# Patient Record
Sex: FEMALE | Race: WHITE | NOT HISPANIC OR LATINO | Employment: FULL TIME | URBAN - METROPOLITAN AREA
[De-identification: names, ages, dates, MRNs, and addresses within clinical notes are randomized per-mention and may not be internally consistent; named-entity substitution may affect disease eponyms.]

---

## 2018-06-05 ENCOUNTER — OFFICE VISIT (OUTPATIENT)
Dept: URGENT CARE | Facility: CLINIC | Age: 23
End: 2018-06-05
Payer: COMMERCIAL

## 2018-06-05 VITALS
TEMPERATURE: 98 F | HEIGHT: 60 IN | WEIGHT: 100 LBS | HEART RATE: 84 BPM | DIASTOLIC BLOOD PRESSURE: 60 MMHG | BODY MASS INDEX: 19.63 KG/M2 | OXYGEN SATURATION: 98 % | RESPIRATION RATE: 18 BRPM | SYSTOLIC BLOOD PRESSURE: 103 MMHG

## 2018-06-05 DIAGNOSIS — S62.002A CLOSED NONDISPLACED FRACTURE OF SCAPHOID OF LEFT WRIST, UNSPECIFIED PORTION OF SCAPHOID, INITIAL ENCOUNTER: Primary | ICD-10-CM

## 2018-06-05 DIAGNOSIS — T14.90XA TRAUMA: ICD-10-CM

## 2018-06-05 PROCEDURE — 99204 OFFICE O/P NEW MOD 45 MIN: CPT | Mod: S$GLB,,, | Performed by: NURSE PRACTITIONER

## 2018-06-05 RX ORDER — HYDROCODONE BITARTRATE AND ACETAMINOPHEN 5; 325 MG/1; MG/1
1 TABLET ORAL EVERY 6 HOURS PRN
Qty: 20 TABLET | Refills: 0 | Status: SHIPPED | OUTPATIENT
Start: 2018-06-05 | End: 2019-05-02

## 2018-06-05 NOTE — PATIENT INSTRUCTIONS
Wear the thumb spica splint and sling  Rest ice elevation   Follow with ortho as soon as possible   Take tylenol/motrin as needed for pain   norco for breakthrough pain (dont take the tylenol and norco at the same time because norco has tylenol in it)  Do not take the norco if you are driving or doing anything that requires your full attention       X-ray Wrist Complete Left  Result Date: 6/5/2018  EXAMINATION: XR WRIST COMPLETE 3 VIEWS LEFT CLINICAL HISTORY: Injury, unspecified, initial encounter TECHNIQUE: PA, lateral, and oblique views of the left wrist were performed. COMPARISON: None FINDINGS: There is a fracture in the midbody of the navicular.  Bones are well mineralized.  Remainder the carpal bones appear intact.     Fracture midbody of the navicula with minimal separation.  Orthopedic consultation suggested due to the increased risk of osteonecrosis. This report was flagged in Epic as abnormal. Electronically signed by: Terry Benjamin MD Date:    06/05/2018 Time:    11:51    Navicular Wrist Fracture, Confirmed  You have a break (fracture) in one of the small bones of your wrist. This bone heals slowly. You may need to be in a cast for up to 3 months. Some navicular fractures dont heal the way they should. If so, you may need surgery at a later time.    Home care  Follow these guidelines when caring for yourself at home:  · Keep your hand elevated to reduce pain and swelling. When sitting or lying down keep your arm above the level of your heart. You can do this by placing your arm on a pillow that rests on your chest or on a pillow at your side. This is most important during the first 2 days (48 hours) after the injury.  · Put an ice pack on the injured area. Do this for 20 minutes every 1 to 2 hours the first day for pain relief. You can make an ice pack by wrapping a plastic bag of ice cubes in a thin towel. As the ice melts, be careful that the cast or splint doesnt get wet. Continue using the ice pack  3 to 4 times a day for the next 2 days. Then use the ice pack as needed to ease pain and swelling.  · Keep the cast or splint completely dry at all times. Bathe with your cast or splint out of the water. Protect it with a large plastic bag, rubber-banded at the top end. If a fiberglass cast or splint gets wet, you can dry it with a hair dryer on the cool setting.  · You may use acetaminophen or ibuprofen to control pain, unless another pain medicine was prescribed. If you have chronic liver or kidney disease, talk with your healthcare provider before using these medicines. Also talk with your provider if youve had a stomach ulcer or gastrointestinal bleeding.  · If you smoke, try to quit. Tobacco use can keep this fracture from healing the way it should. Smoking raises the risk that you will need surgery for this fracture.  Follow-up care  Follow up with your healthcare provider, or as advised. This is to make sure the bone is healing the way it should. If a splint was put on, it may be changed to a cast during your follow-up visit. When the cast is removed, you will need to do special hand and wrist exercises. These will help you get back your wrist strength and range of motion. Some people have permanent stiffness in the wrist after this type of injury.  If X-rays were taken, a radiologist may look at them. You will be told of any new findings that may affect your care.  When to seek medical advice  Call your healthcare provider right away if any of these occur:  · The cast or splint cracks  · The plaster cast or splint becomes wet or soft  · The fiberglass cast or splint stays wet for more than 24 hours  · Tightness or pain under the cast or splint gets worse  · Fingers become swollen, cold, blue, numb, or tingly  · Fingers are hard to move  · Bad odor from the cast or splint or wound fluid stains the cast or splint  · The skin around the cast or splint becomes red, swollen, or irritated  · Fever of 101ºF  (38.3ºC) or higher, or as directed by your healthcare provider  Date Last Reviewed: 5/1/2017  © 3446-5888 [employer]. [address] [place] [address], [place] [address]. All rights reserved. This information is not intended as a substitute for professional medical care. Always follow your healthcare professional's instructions.
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
## 2018-06-08 ENCOUNTER — OFFICE VISIT (OUTPATIENT)
Dept: ORTHOPEDICS | Facility: CLINIC | Age: 23
End: 2018-06-08
Payer: COMMERCIAL

## 2018-06-08 VITALS
SYSTOLIC BLOOD PRESSURE: 112 MMHG | HEART RATE: 91 BPM | BODY MASS INDEX: 21.6 KG/M2 | HEIGHT: 60 IN | WEIGHT: 110 LBS | DIASTOLIC BLOOD PRESSURE: 79 MMHG

## 2018-06-08 DIAGNOSIS — S62.022A CLOSED DISPLACED FRACTURE OF MIDDLE THIRD OF SCAPHOID BONE OF LEFT WRIST, INITIAL ENCOUNTER: Primary | ICD-10-CM

## 2018-06-08 PROCEDURE — 3008F BODY MASS INDEX DOCD: CPT | Mod: CPTII,S$GLB,, | Performed by: PHYSICIAN ASSISTANT

## 2018-06-08 PROCEDURE — 99999 PR PBB SHADOW E&M-EST. PATIENT-LVL III: CPT | Mod: PBBFAC,,, | Performed by: PHYSICIAN ASSISTANT

## 2018-06-08 PROCEDURE — 99214 OFFICE O/P EST MOD 30 MIN: CPT | Mod: S$GLB,,, | Performed by: PHYSICIAN ASSISTANT

## 2018-06-08 RX ORDER — DIAZEPAM 5 MG/1
5 TABLET ORAL
Qty: 1 TABLET | Refills: 0 | Status: SHIPPED | OUTPATIENT
Start: 2018-06-08 | End: 2018-06-14
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
## 2018-06-12 ENCOUNTER — HOSPITAL ENCOUNTER (OUTPATIENT)
Dept: RADIOLOGY | Facility: HOSPITAL | Age: 23
Discharge: HOME | End: 2018-06-12
Attending: PHYSICIAN ASSISTANT
Payer: COMMERCIAL

## 2018-06-12 DIAGNOSIS — S62.022A CLOSED DISPLACED FRACTURE OF MIDDLE THIRD OF SCAPHOID BONE OF LEFT WRIST, INITIAL ENCOUNTER: ICD-10-CM

## 2018-06-12 PROCEDURE — 73221 MRI JOINT UPR EXTREM W/O DYE: CPT | Mod: TC,LT

## 2018-06-12 PROCEDURE — 73221 MRI JOINT UPR EXTREM W/O DYE: CPT | Mod: 26,LT,, | Performed by: RADIOLOGY

## 2018-06-14 ENCOUNTER — ANESTHESIA EVENT (OUTPATIENT)
Dept: SURGERY | Facility: OTHER | Age: 23
End: 2018-06-14
Payer: COMMERCIAL

## 2018-06-14 ENCOUNTER — OFFICE VISIT (OUTPATIENT)
Dept: ORTHOPEDICS | Facility: CLINIC | Age: 23
End: 2018-06-14
Payer: COMMERCIAL

## 2018-06-14 VITALS — WEIGHT: 110 LBS | HEIGHT: 60 IN | BODY MASS INDEX: 21.6 KG/M2 | HEART RATE: 72 BPM

## 2018-06-14 DIAGNOSIS — S62.022A CLOSED DISPLACED FRACTURE OF MIDDLE THIRD OF SCAPHOID BONE OF LEFT WRIST, INITIAL ENCOUNTER: Primary | ICD-10-CM

## 2018-06-14 DIAGNOSIS — S62.022A DISPLACED FRACTURE OF MIDDLE THIRD OF NAVICULAR (SCAPHOID) BONE OF LEFT WRIST, INITIAL ENCOUNTER FOR CLOSED FRACTURE: Primary | ICD-10-CM

## 2018-06-14 PROCEDURE — 99999 PR PBB SHADOW E&M-EST. PATIENT-LVL II: CPT | Mod: PBBFAC,,, | Performed by: ORTHOPAEDIC SURGERY

## 2018-06-14 PROCEDURE — 99214 OFFICE O/P EST MOD 30 MIN: CPT | Mod: S$GLB,,, | Performed by: ORTHOPAEDIC SURGERY

## 2018-06-14 PROCEDURE — 3008F BODY MASS INDEX DOCD: CPT | Mod: CPTII,S$GLB,, | Performed by: ORTHOPAEDIC SURGERY

## 2018-06-14 RX ORDER — OXYCODONE AND ACETAMINOPHEN 5; 325 MG/1; MG/1
1 TABLET ORAL EVERY 4 HOURS PRN
Qty: 33 TABLET | Refills: 0 | Status: SHIPPED | OUTPATIENT
Start: 2018-06-14 | End: 2019-05-02

## 2018-06-14 NOTE — PROGRESS NOTES
The patient is here for MRI results. The patient does complain of continued pain. Pt thinks she may have injured it Decemeber but unsure.She had a few falls during [place]    The MRI ispositive for scaphoid fx.    Plan:  Conservative versus surgical treatment was discussed. The patient is electing for surgery. The patient is not electing for conservative treatment.   I have explained the risks, benefits, and alternatives of the procedure to the patient in great detail. The patient voices understanding and all questions have been answered. The patient agrees with to proceed as planned. Consents were performed in clinic.

## 2018-06-14 NOTE — ANESTHESIA PREPROCEDURE EVALUATION
06/14/2018  Gabriela Cooper is a 22 y.o., female.    Anesthesia Evaluation    I have reviewed the Patient Summary Reports.    I have reviewed the Nursing Notes.   I have reviewed the Medications.     Review of Systems  Anesthesia Hx:  Denies Family Hx of Anesthesia complications.   Denies Personal Hx of Anesthesia complications.   Social:  Non-Smoker    Hematology/Oncology:  Hematology Normal   Oncology Normal     EENT/Dental:EENT/Dental Normal   Cardiovascular:  Cardiovascular Normal Exercise tolerance: good     Pulmonary:  Pulmonary Normal    Renal/:  Renal/ Normal     Hepatic/GI:  Hepatic/GI Normal    Musculoskeletal:  Musculoskeletal Normal    Neurological:  Neurology Normal    Endocrine:  Endocrine Normal    Dermatological:  Skin Normal    Psych:  Psychiatric Normal           Physical Exam  General:  Well nourished      Dental:  Dental Findings: In tact        Mental Status:  Mental Status Findings:  Cooperative, Alert and Oriented         Anesthesia Plan  Type of Anesthesia, risks & benefits discussed:  Anesthesia Type:  regional  Patient's Preference:   Intra-op Monitoring Plan: standard ASA monitors  Intra-op Monitoring Plan Comments:   Post Op Pain Control Plan:   Post Op Pain Control Plan Comments:   Induction:    Beta Blocker:         Informed Consent: Patient understands risks and agrees with Anesthesia plan.  Questions answered. Anesthesia consent signed with patient.  ASA Score: 1     Day of Surgery Review of History & Physical:    H&P update referred to the surgeon.         Ready For Surgery From Anesthesia Perspective.

## 2018-06-15 ENCOUNTER — ANESTHESIA (OUTPATIENT)
Dept: SURGERY | Facility: OTHER | Age: 23
End: 2018-06-15
Payer: COMMERCIAL

## 2018-06-15 ENCOUNTER — HOSPITAL ENCOUNTER (OUTPATIENT)
Facility: OTHER | Age: 23
Discharge: HOME | End: 2018-06-15
Attending: ORTHOPAEDIC SURGERY | Admitting: ORTHOPAEDIC SURGERY
Payer: COMMERCIAL

## 2018-06-15 VITALS
DIASTOLIC BLOOD PRESSURE: 68 MMHG | RESPIRATION RATE: 16 BRPM | BODY MASS INDEX: 21.6 KG/M2 | TEMPERATURE: 98 F | HEART RATE: 68 BPM | SYSTOLIC BLOOD PRESSURE: 114 MMHG | OXYGEN SATURATION: 100 % | WEIGHT: 110 LBS | HEIGHT: 60 IN

## 2018-06-15 DIAGNOSIS — S62.009A SCAPHOID FRACTURE: ICD-10-CM

## 2018-06-15 DIAGNOSIS — S62.022A CLOSED DISPLACED FRACTURE OF MIDDLE THIRD OF SCAPHOID BONE OF LEFT WRIST, INITIAL ENCOUNTER: Primary | ICD-10-CM

## 2018-06-15 LAB
B-HCG UR QL: NEGATIVE
CTP QC/QA: YES

## 2018-06-15 PROCEDURE — 71000015 HC POSTOP RECOV 1ST HR: Performed by: ORTHOPAEDIC SURGERY

## 2018-06-15 PROCEDURE — 36000708 HC OR TIME LEV III 1ST 15 MIN: Performed by: ORTHOPAEDIC SURGERY

## 2018-06-15 PROCEDURE — 63600175 PHARM REV CODE 636 W HCPCS: Performed by: STUDENT IN AN ORGANIZED HEALTH CARE EDUCATION/TRAINING PROGRAM

## 2018-06-15 PROCEDURE — 36000709 HC OR TIME LEV III EA ADD 15 MIN: Performed by: ORTHOPAEDIC SURGERY

## 2018-06-15 PROCEDURE — 63600175 PHARM REV CODE 636 W HCPCS

## 2018-06-15 PROCEDURE — 25000003 PHARM REV CODE 250: Performed by: ANESTHESIOLOGY

## 2018-06-15 PROCEDURE — C1769 GUIDE WIRE: HCPCS | Performed by: ORTHOPAEDIC SURGERY

## 2018-06-15 PROCEDURE — 37000009 HC ANESTHESIA EA ADD 15 MINS: Performed by: ORTHOPAEDIC SURGERY

## 2018-06-15 PROCEDURE — 63600175 PHARM REV CODE 636 W HCPCS: Performed by: ANESTHESIOLOGY

## 2018-06-15 PROCEDURE — 27201423 OPTIME MED/SURG SUP & DEVICES STERILE SUPPLY: Performed by: ORTHOPAEDIC SURGERY

## 2018-06-15 PROCEDURE — C1713 ANCHOR/SCREW BN/BN,TIS/BN: HCPCS | Performed by: ORTHOPAEDIC SURGERY

## 2018-06-15 PROCEDURE — 25000003 PHARM REV CODE 250: Performed by: ORTHOPAEDIC SURGERY

## 2018-06-15 PROCEDURE — 37000008 HC ANESTHESIA 1ST 15 MINUTES: Performed by: ORTHOPAEDIC SURGERY

## 2018-06-15 PROCEDURE — 25628 OPTX CARPL SCPHD FX INT FIXJ: CPT | Mod: LT,,, | Performed by: ORTHOPAEDIC SURGERY

## 2018-06-15 PROCEDURE — 63600175 PHARM REV CODE 636 W HCPCS: Performed by: NURSE ANESTHETIST, CERTIFIED REGISTERED

## 2018-06-15 PROCEDURE — 81025 URINE PREGNANCY TEST: CPT | Performed by: ANESTHESIOLOGY

## 2018-06-15 DEVICE — SCREW BONE 18MM ACUTRAK II: Type: IMPLANTABLE DEVICE | Site: WRIST | Status: FUNCTIONAL

## 2018-06-15 RX ORDER — OXYCODONE HYDROCHLORIDE 5 MG/1
5 TABLET ORAL EVERY 4 HOURS PRN
Status: DISCONTINUED | OUTPATIENT
Start: 2018-06-15 | End: 2018-06-15 | Stop reason: HOSPADM

## 2018-06-15 RX ORDER — MUPIROCIN 20 MG/G
1 OINTMENT TOPICAL 2 TIMES DAILY
Status: DISCONTINUED | OUTPATIENT
Start: 2018-06-15 | End: 2018-06-15 | Stop reason: HOSPADM

## 2018-06-15 RX ORDER — SODIUM CHLORIDE 0.9 % (FLUSH) 0.9 %
5 SYRINGE (ML) INJECTION
Status: DISCONTINUED | OUTPATIENT
Start: 2018-06-15 | End: 2018-06-15 | Stop reason: HOSPADM

## 2018-06-15 RX ORDER — MUPIROCIN 20 MG/G
OINTMENT TOPICAL
Status: DISCONTINUED | OUTPATIENT
Start: 2018-06-15 | End: 2018-06-15 | Stop reason: HOSPADM

## 2018-06-15 RX ORDER — HYDROMORPHONE HYDROCHLORIDE 2 MG/ML
0.2 INJECTION, SOLUTION INTRAMUSCULAR; INTRAVENOUS; SUBCUTANEOUS EVERY 5 MIN PRN
Status: DISCONTINUED | OUTPATIENT
Start: 2018-06-15 | End: 2018-06-15 | Stop reason: HOSPADM

## 2018-06-15 RX ORDER — FENTANYL CITRATE 50 UG/ML
100 INJECTION, SOLUTION INTRAMUSCULAR; INTRAVENOUS EVERY 5 MIN PRN
Status: COMPLETED | OUTPATIENT
Start: 2018-06-15 | End: 2018-06-15

## 2018-06-15 RX ORDER — PROPOFOL 10 MG/ML
VIAL (ML) INTRAVENOUS CONTINUOUS PRN
Status: DISCONTINUED | OUTPATIENT
Start: 2018-06-15 | End: 2018-06-15

## 2018-06-15 RX ORDER — CEFAZOLIN SODIUM 1 G/3ML
2 INJECTION, POWDER, FOR SOLUTION INTRAMUSCULAR; INTRAVENOUS
Status: COMPLETED | OUTPATIENT
Start: 2018-06-15 | End: 2018-06-15

## 2018-06-15 RX ORDER — ONDANSETRON 8 MG/1
8 TABLET, ORALLY DISINTEGRATING ORAL EVERY 8 HOURS PRN
Status: DISCONTINUED | OUTPATIENT
Start: 2018-06-15 | End: 2018-06-15 | Stop reason: HOSPADM

## 2018-06-15 RX ORDER — SODIUM CHLORIDE, SODIUM LACTATE, POTASSIUM CHLORIDE, CALCIUM CHLORIDE 600; 310; 30; 20 MG/100ML; MG/100ML; MG/100ML; MG/100ML
INJECTION, SOLUTION INTRAVENOUS CONTINUOUS PRN
Status: DISCONTINUED | OUTPATIENT
Start: 2018-06-15 | End: 2018-06-15

## 2018-06-15 RX ORDER — ONDANSETRON 2 MG/ML
4 INJECTION INTRAMUSCULAR; INTRAVENOUS EVERY 12 HOURS PRN
Status: DISCONTINUED | OUTPATIENT
Start: 2018-06-15 | End: 2018-06-15 | Stop reason: HOSPADM

## 2018-06-15 RX ORDER — LIDOCAINE HYDROCHLORIDE 20 MG/ML
INJECTION, SOLUTION EPIDURAL; INFILTRATION; INTRACAUDAL; PERINEURAL
Status: DISCONTINUED | OUTPATIENT
Start: 2018-06-15 | End: 2018-06-15

## 2018-06-15 RX ORDER — ROPIVACAINE HYDROCHLORIDE 5 MG/ML
INJECTION, SOLUTION EPIDURAL; INFILTRATION; PERINEURAL CONTINUOUS PRN
Status: DISCONTINUED | OUTPATIENT
Start: 2018-06-15 | End: 2018-06-15

## 2018-06-15 RX ORDER — ONDANSETRON 2 MG/ML
INJECTION INTRAMUSCULAR; INTRAVENOUS
Status: DISCONTINUED | OUTPATIENT
Start: 2018-06-15 | End: 2018-06-15

## 2018-06-15 RX ORDER — SODIUM CHLORIDE 0.9 % (FLUSH) 0.9 %
3 SYRINGE (ML) INJECTION
Status: DISCONTINUED | OUTPATIENT
Start: 2018-06-15 | End: 2018-06-15 | Stop reason: HOSPADM

## 2018-06-15 RX ORDER — OXYCODONE HYDROCHLORIDE 5 MG/1
10 TABLET ORAL EVERY 4 HOURS PRN
Status: DISCONTINUED | OUTPATIENT
Start: 2018-06-15 | End: 2018-06-15 | Stop reason: HOSPADM

## 2018-06-15 RX ORDER — MIDAZOLAM HYDROCHLORIDE 1 MG/ML
2 INJECTION INTRAMUSCULAR; INTRAVENOUS
Status: COMPLETED | OUTPATIENT
Start: 2018-06-15 | End: 2018-06-15

## 2018-06-15 RX ORDER — BACITRACIN 500 [USP'U]/G
OINTMENT TOPICAL
Status: DISCONTINUED | OUTPATIENT
Start: 2018-06-15 | End: 2018-06-15 | Stop reason: HOSPADM

## 2018-06-15 RX ORDER — OXYCODONE HYDROCHLORIDE 5 MG/1
5 TABLET ORAL
Status: DISCONTINUED | OUTPATIENT
Start: 2018-06-15 | End: 2018-06-15 | Stop reason: HOSPADM

## 2018-06-15 RX ADMIN — ONDANSETRON 4 MG: 2 INJECTION INTRAMUSCULAR; INTRAVENOUS at 11:06

## 2018-06-15 RX ADMIN — PROPOFOL 75 MCG/KG/MIN: 10 INJECTION, EMULSION INTRAVENOUS at 11:06

## 2018-06-15 RX ADMIN — LIDOCAINE HYDROCHLORIDE 20 ML: 20 INJECTION, SOLUTION EPIDURAL; INFILTRATION; INTRACAUDAL; PERINEURAL at 11:06

## 2018-06-15 RX ADMIN — CEFAZOLIN 2 G: 330 INJECTION, POWDER, FOR SOLUTION INTRAMUSCULAR; INTRAVENOUS at 11:06

## 2018-06-15 RX ADMIN — FENTANYL CITRATE 100 MCG: 50 INJECTION, SOLUTION INTRAMUSCULAR; INTRAVENOUS at 10:06

## 2018-06-15 RX ADMIN — MIDAZOLAM HYDROCHLORIDE 2 MG: 1 INJECTION, SOLUTION INTRAMUSCULAR; INTRAVENOUS at 11:06

## 2018-06-15 RX ADMIN — MIDAZOLAM HYDROCHLORIDE 2 MG: 1 INJECTION, SOLUTION INTRAMUSCULAR; INTRAVENOUS at 10:06

## 2018-06-15 RX ADMIN — ROPIVACAINE HYDROCHLORIDE 15 ML/HR: 5 INJECTION, SOLUTION EPIDURAL; INFILTRATION; PERINEURAL at 11:06

## 2018-06-15 RX ADMIN — SODIUM CHLORIDE, SODIUM LACTATE, POTASSIUM CHLORIDE, AND CALCIUM CHLORIDE: 600; 310; 30; 20 INJECTION, SOLUTION INTRAVENOUS at 10:06

## 2018-06-15 NOTE — INTERVAL H&P NOTE
The patient has been examined and the H&P has been reviewed:    I concur with the findings and no changes have occurred since H&P was written.    Anesthesia/Surgery risks, benefits and alternative options discussed and understood by patient/family.          Active Hospital Problems    Diagnosis  POA    Scaphoid fracture [S62.009A]  Yes      Resolved Hospital Problems    Diagnosis Date Resolved POA   No resolved problems to display.

## 2018-06-15 NOTE — DISCHARGE SUMMARY
Discharge Note    SUMMARY     Admit Date: 6/15/2018    Discharge Date and Time:  06/15/2018 12:37 PM    Hospital Course (synopsis of major diagnoses, care, treatment, and services provided during the course of the hospital stay): Patient admitted for outpatient surgery, tolerated procedure well without complications and discharged home.         Final Diagnosis: Post-Op Diagnosis Codes:     * Displaced fracture of middle third of navicular (scaphoid) bone of left wrist, initial encounter for closed fracture [S62.022A]    Disposition: Home or Self Care    Follow Up/Patient Instructions:     Medications:  Reconciled Home Medications:      Medication List      CONTINUE taking these medications    HYDROcodone-acetaminophen 5-325 mg per tablet  Commonly known as:  NORCO  Take 1 tablet by mouth every 6 (six) hours as needed for Pain.     oxyCODONE-acetaminophen 5-325 mg per tablet  Commonly known as:  PERCOCET  Take 1 tablet by mouth every 4 (four) hours as needed.            Discharge Procedure Orders  Diet general     Call MD for:  severe uncontrolled pain     Call MD for:  difficulty breathing, headache or visual disturbances     Call MD for:  redness, tenderness, or signs of infection (pain, swelling, redness, odor or green/yellow discharge around incision site)     Other restrictions (specify):   Order Comments: NIKIA CHAVEZ     Leave dressing on - Keep it clean, dry, and intact until clinic visit       Follow-up Information     Follow up In 2 weeks.

## 2018-06-15 NOTE — INTERVAL H&P NOTE
The patient has been examined and the H&P has been reviewed:    I concur with the findings and no changes have occurred since H&P was written.  I have explained the risks, benefits, and alternatives of the procedure to the patient in great detail. The patient voices understanding and all questions have been answered. The patient agrees with to proceed as planned. Consents were performed in clinic.    Anesthesia/Surgery risks, benefits and alternative options discussed and understood by patient/family.          Active Hospital Problems    Diagnosis  POA    Scaphoid fracture [S62.009A]  Yes      Resolved Hospital Problems    Diagnosis Date Resolved POA   No resolved problems to display.

## 2018-06-15 NOTE — ANESTHESIA POSTPROCEDURE EVALUATION
Anesthesia Post Evaluation    Patient: Gabriela Cooper    Procedure(s) Performed: Procedure(s) (LRB):  ORIF, WRIST - LEFT SCAPHOID (Left)    Final Anesthesia Type: regional  Patient location during evaluation: [facility]  Patient participation: Yes- Able to Participate  Level of consciousness: awake and alert  Post-procedure vital signs: reviewed and stable  Pain management: adequate  Airway patency: patent  PONV status at discharge: No PONV  Anesthetic complications: no      Cardiovascular status: blood pressure returned to baseline  Respiratory status: unassisted and spontaneous ventilation  Hydration status: euvolemic  Follow-up not needed.        Visit Vitals  /83 (BP Location: Right arm, Patient Position: Lying)   Pulse 72   Temp 36.7 °C (98 °F) (Oral)   Resp 16   Ht 5' (1.524 m)   Wt 49.9 kg (110 lb)   LMP 06/12/2018   SpO2 100%   Breastfeeding? No   BMI 21.48 kg/m²       Pain/Campbell Score: Pain Assessment Performed: Yes (6/15/2018 12:32 PM)  Presence of Pain: denies (6/15/2018 12:32 PM)  Campbell Score: 10 (6/15/2018 12:32 PM)

## 2018-06-15 NOTE — BRIEF OP NOTE
[facility]  Brief Operative Note     SUMMARY     Surgery Date: 6/15/2018     Surgeon(s) and Role:     * Marixa Bridges MD - Primary    Assisting Surgeon: None    Pre-op Diagnosis:  Displaced fracture of middle third of navicular (scaphoid) bone of left wrist, initial encounter for closed fracture [S62.022A]    Post-op Diagnosis:  Post-Op Diagnosis Codes:     * Displaced fracture of middle third of navicular (scaphoid) bone of left wrist, initial encounter for closed fracture [S62.022A]    Procedure(s) (LRB):  ORIF, WRIST - LEFT SCAPHOID (Left)    Anesthesia: Regional    Description of the findings of the procedure:  Left scaphoid waist fx    Findings/Key Components: left scaphoid waist fx    Estimated Blood Loss: 0         Specimens:   Specimen (12h ago through future)    None

## 2018-06-15 NOTE — ANESTHESIA PROCEDURE NOTES
Left IFB    Patient location during procedure: holding area    Reason for block: primary anesthetic   Diagnosis: Left scaphoid fracture   Timeout: 6/15/2018 10:46 AM   End time: 6/15/2018 11:00 AM  Surgery related to: Or[facility]  Staffing  Anesthesiologist: JAMAL MARY  Performed: anesthesiologist   Preanesthetic Checklist  Completed: patient identified, site marked, surgical consent, pre-op evaluation, timeout performed, IV checked, risks and benefits discussed and monitors and equipment checked  Peripheral Block  Patient position: supine  Prep: ChloraPrep and site prepped and draped  Patient monitoring: heart rate, cardiac monitor, continuous pulse ox and frequent blood pressure checks  Block type: infraclavicular  Laterality: left  Injection technique: single shot  Needle  Needle type: Echogenic   Needle gauge: 21 G  Needle length: 4 in  Needle localization: anatomical landmarks and ultrasound guidance   -ultrasound image captured on disc.  Assessment  Injection assessment: negative aspiration, negative parasthesia and local visualized surrounding nerve  Paresthesia pain: none  Heart rate change: no  Slow fractionated injection: yes  Medications:  Bolus administered: 15 mL of 0.5 ropivacaine  Epinephrine added: none  Additional Notes  Plus 20 cc 2% Lidocaine

## 2018-06-15 NOTE — DISCHARGE INSTRUCTIONS
Anesthesia: Monitored Anesthesia Care (MAC)    Anesthesia Safety  · Have an adult family member or friend drive you home after the procedure.  · For the first 24 hours after your surgery:  ¨ Do not drive or use heavy equipment.  ¨ Do not make important decisions or sign documents.  ¨ Avoid alcohol.  ¨ Have someone stay with you, if possible. They can watch for problems and help keep you safe.    PLEASE FOLLOW ANY OTHER INSTRUCTIONS PROVIDED TO YOU BY DR. POOL!

## 2018-06-18 NOTE — OP NOTE
DATE OF PROCEDURE:  06/15/2018.    SERVICE:  Orthopedics.    ATTENDING SURGEON:  Marixa Bridges M.D.    RESIDENT SURGEON:  Guanako ANNE    PREOPERATIVE DIAGNOSIS:  Left scaphoid displaced wrist fracture.    POSTOPERATIVE DIAGNOSIS:  Left scaphoid displaced wrist fracture.    PROCEDURES:  1.  Open reduction and internal fixation of left scaphoid wrist fracture.  2.  Fluoro use less than 1 hour.  3.  Splint application.    ANESTHESIA:  Regional MAC.    FLUIDS:  Lactated Ringers.    BLOOD LOSS:  Minimal.  No blood was given.    TOURNIQUET TIME:  1 hour.    PACKS AND DRAINS:  None.    IMPLANTS:  An 18 mm Acutrak mini screw.    COMPLICATIONS:  None.    INDICATIONS FOR PROCEDURE:  Ms. Cooper is a 22-year-old female.  She presented   to clinic with wrist pain.  She states that she fell at some point and she is   not sure when she actually broke her wrist.  She stated that she had a bad fall   in December and she also had a few more falls.  She states she was at [facility] and she had a few missteps during that time.  After evaluating the   wrist radiographically as well as with an MRI, it showed that she had a   displaced wrist fracture.  After much discussion with the patient, we elected   for surgical intervention.  Risks and benefits were explained to the patient in   clinic.  Consents were signed in the clinic.    PROCEDURE IN DETAIL:  After correct site was marked with the patient's   participation in the holding area, the patient underwent regional anesthesia,   was brought to the Operating Room, placed in supine position, underwent MAC   anesthesia.  A well-padded nonsterile tourniquet was placed on the right upper   extremity.  The right upper extremity was prepped and draped in normal sterile   fashion.  A timeout was conducted for the correct site, procedure and the   patient to be indicated.  IV antibiotics were given to the patient   preoperatively.  After the arm was exsanguinated with Esmarch,  tourniquet was   insufflated to 250 mmHg.  An incision was marked out under C-arm fluoroscopy to   confirm placement of the proximal pole of the scaphoid.  A transverse incision   was made.  Blunt dissection was made down to the capsule.  The capsule was   elevated and incised.  The tendons were gently retracted out of the way.  The   wrist was placed into a flexed position so the proximal pole of the scaphoid was   identified.  The fracture was also identified from the same incision site and   it was displaced.  K-wire was then introduced from the proximal pole under C-arm   fluoroscopy and placed across the fracture site in the distal pole.  The K-wire   was placed in the center-center position.  After that was completed, the drill   was used over the K-wire for placement of the Acutrak screw.  Prior to that, the   K-wire was measured and showed that it was roughly 26 in measurement.  The   K-wire was then placed out of the scaphoid and into the trapezium.  Once that   was completed, the 18 mm Acutrak screw was placed over the K-wire.  Good   compression was achieved.  The base of the Acutrak screw was buried in the   cartilage and it showed that it was in good position.  Multiple x-rays were   taken under C-arm fluoroscopy to confirm position of the Acutrak screw.  The   K-wire was also removed prior to this.  The patient was placed through range of   motion, showed it did not impinge.  Again it showed good compression of the   fracture.  Area was then irrigated with copious amounts of normal saline.    Capsule was closed with Vicryl, Monocryl and Dermabond closed the skin.  Sterile   dressing was applied.  Tourniquet was deflated.  Brisk capillary refill ensued.    The patient was placed in a well-padded thumb spica splint, tolerated the   procedure well, was brought to recovery area in stable condition.    POSTOPERATIVE PLAN FOR THIS PATIENT:  She is to keep the dressing clean, dry and   intact.  We will see  her back at two weeks' time.  She will be casted, bone   stimulator port to be placed at that time.  She will be reevaluated for bone   stimulator to start roughly when she has good scar formation.      LES/IN  dd: 06/18/2018 10:38:38 (CDT)  td: 06/18/2018 15:45:31 (CDT)  Doc ID   #[id number]  Job ID #[id number]    CC:

## 2018-06-28 ENCOUNTER — DOCUMENTATION ONLY (OUTPATIENT)
Dept: ORTHOPEDICS | Facility: CLINIC | Age: 23
End: 2018-06-28

## 2018-06-28 ENCOUNTER — OFFICE VISIT (OUTPATIENT)
Dept: ORTHOPEDICS | Facility: CLINIC | Age: 23
End: 2018-06-28
Payer: COMMERCIAL

## 2018-06-28 VITALS
HEART RATE: 73 BPM | SYSTOLIC BLOOD PRESSURE: 127 MMHG | BODY MASS INDEX: 21.6 KG/M2 | HEIGHT: 60 IN | WEIGHT: 110 LBS | DIASTOLIC BLOOD PRESSURE: 85 MMHG

## 2018-06-28 DIAGNOSIS — S62.022K CLOSED DISPLACED FRACTURE OF MIDDLE THIRD OF SCAPHOID OF LEFT WRIST WITH NONUNION, SUBSEQUENT ENCOUNTER: Primary | ICD-10-CM

## 2018-06-28 PROCEDURE — 29085 APPL CAST HAND&LWR FOREARM: CPT | Mod: 58,LT,S$GLB, | Performed by: PHYSICIAN ASSISTANT

## 2018-06-28 PROCEDURE — 99024 POSTOP FOLLOW-UP VISIT: CPT | Mod: S$GLB,,, | Performed by: PHYSICIAN ASSISTANT

## 2018-06-28 PROCEDURE — 76000 FLUOROSCOPY <1 HR PHYS/QHP: CPT | Mod: 26,S$GLB,, | Performed by: PHYSICIAN ASSISTANT

## 2018-06-28 PROCEDURE — 99999 PR PBB SHADOW E&M-EST. PATIENT-LVL III: CPT | Mod: PBBFAC,,, | Performed by: PHYSICIAN ASSISTANT

## 2018-06-28 NOTE — PROGRESS NOTES
"Ms. Cooper is here today for a post-operative visit.  She is 13 days status post Open reduction and internal fixation of left scaphoid wrist fracture by Dr. Bridges on 6/15/18. She reports that she is doing well with moderate pain in the wrist.  Pain is reported as 4/10.  She is was taking pain medication initially after surgery, but has discontinued it and is now "just drinking wine, which helps some with the pain."  She denies fever, chills, and sweats since the time of the surgery. She has not started vitamin C supplementation.    Physical exam:    Vitals:    06/28/18 0915   BP: 127/85   Pulse: 73   Weight: 49.9 kg (110 lb)   Height: 5' (1.524 m)   PainSc:   4   PainLoc: Wrist     Vital signs are stable, patient is afebrile.  Patient is well dressed and well groomed, no acute distress.  Alert and oriented to person, place, and time.  Post op dressing and splint taken down.  Incision is clean, dry and intact. Very mild maceration at the incision, no wound breakdown.  There is no erythema or exudate.  There is no sign of any infection. She is NVI. Suture tails removed without difficulty.      Assessment: 13 days status post Open reduction and internal fixation of left scaphoid wrist fracture    Plan:  Gabriela was seen today for post-op evaluation.    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

## 2018-07-09 ENCOUNTER — TELEPHONE (OUTPATIENT)
Dept: ORTHOPEDICS | Facility: CLINIC | Age: 23
End: 2018-07-09

## 2018-07-09 NOTE — TELEPHONE ENCOUNTER
----- Message from Brittany Covarrubias sent at 7/9/2018  1:29 PM CDT -----  Contact: Self            Name of Who is Calling: Self      What is the request in detail: Pt states her swelling has gone down and she is concerned that her cast is too loose and wants to know if she needs to come in.      Can the clinic reply by [facility]: N      What Number to Call Back if not in [facility]: [phone]

## 2018-07-09 NOTE — TELEPHONE ENCOUNTER
Spoke with pt appt made tomorrow 7/10/18 at 8:00am for a cast check. Pt verbalized understanding.

## 2018-07-10 ENCOUNTER — TELEPHONE (OUTPATIENT)
Dept: ORTHOPEDICS | Facility: CLINIC | Age: 23
End: 2018-07-10

## 2018-07-10 ENCOUNTER — DOCUMENTATION ONLY (OUTPATIENT)
Dept: ORTHOPEDICS | Facility: CLINIC | Age: 23
End: 2018-07-10

## 2018-07-10 ENCOUNTER — OFFICE VISIT (OUTPATIENT)
Dept: ORTHOPEDICS | Facility: CLINIC | Age: 23
End: 2018-07-10
Payer: COMMERCIAL

## 2018-07-10 VITALS — WEIGHT: 110 LBS | BODY MASS INDEX: 21.6 KG/M2 | HEIGHT: 60 IN

## 2018-07-10 DIAGNOSIS — Z47.89 AFTERCARE FOR CAST OR SPLINT CHECK OR CHANGE: Primary | ICD-10-CM

## 2018-07-10 PROCEDURE — 99999 PR PBB SHADOW E&M-EST. PATIENT-LVL II: CPT | Mod: PBBFAC,,, | Performed by: PHYSICIAN ASSISTANT

## 2018-07-10 PROCEDURE — 99024 POSTOP FOLLOW-UP VISIT: CPT | Mod: S$GLB,,, | Performed by: PHYSICIAN ASSISTANT

## 2018-07-10 PROCEDURE — 29075 APPL CST ELBW FNGR SHORT ARM: CPT | Mod: 58,LT,S$GLB, | Performed by: PHYSICIAN ASSISTANT

## 2018-07-10 NOTE — PROGRESS NOTES
Pt presented for cast check. She states the cast has become loose. She feels she is able to move her thumb within the cast. A new left thumb spica cast with bone stim port was placed.     RTC regularly scheduled f/u      Regina Fox PA-C  [facility]   [facility]  [place]

## 2018-07-10 NOTE — TELEPHONE ENCOUNTER
----- Message from ABBEY Helm sent at 7/10/2018  2:21 PM CDT -----  Contact: [employer]  Not approved - please let the patient know  If poor healing at 3 months PO they will consider approving bone stimulator  Thanks!  ----- Message -----  From: Renu Alfaro LPN  Sent: 7/9/2018   3:33 PM  To: ABBEY Helm    Yes. And they won't let me do it. Sorry.   ----- Message -----  From: ABBEY Helm  Sent: 7/9/2018   3:27 PM  To: Renu Alfaro LPN    Denial for what?   We already did her surgery and MRI.  Is this for the bone stimulator?    ----- Message -----  From: Renu Alfaro LPN  Sent: 7/9/2018   2:54 PM  To: ABBEY Helm        ----- Message -----  From: Adriana Ospina  Sent: 7/9/2018   1:16 PM  To: Corine Mojica Staff              Name of Who is Calling: Tania      What is the request in detail: [employer] calling to get a Peer to Peer for a denial. Please call Tania.       Can the clinic reply by [facility]: No      What Number to [facility] Back [facility] not [facility]: [phone]

## 2018-07-10 NOTE — TELEPHONE ENCOUNTER
Spoke to Kvng,  w/Ester, patient has Stim already, but due to her high OOP/deductible, she qualified for their assistance program and will have no OOP regardless of approval/denial of [employer].   Spoke to patient and informed her of above. Pt aware.

## 2018-07-10 NOTE — TELEPHONE ENCOUNTER
Peer to peer with insurance company - bone stimulator will not be approved until 90 days post surgery if X-Ray evidence of poor healing/non-union.

## 2018-07-26 ENCOUNTER — HOSPITAL ENCOUNTER (OUTPATIENT)
Dept: RADIOLOGY | Facility: OTHER | Age: 23
Discharge: HOME | End: 2018-07-26
Attending: PHYSICIAN ASSISTANT
Payer: COMMERCIAL

## 2018-07-26 ENCOUNTER — DOCUMENTATION ONLY (OUTPATIENT)
Dept: ORTHOPEDICS | Facility: CLINIC | Age: 23
End: 2018-07-26

## 2018-07-26 ENCOUNTER — OFFICE VISIT (OUTPATIENT)
Dept: ORTHOPEDICS | Facility: CLINIC | Age: 23
End: 2018-07-26
Payer: COMMERCIAL

## 2018-07-26 VITALS
HEART RATE: 73 BPM | DIASTOLIC BLOOD PRESSURE: 71 MMHG | BODY MASS INDEX: 21.6 KG/M2 | HEIGHT: 60 IN | SYSTOLIC BLOOD PRESSURE: 102 MMHG | WEIGHT: 110 LBS

## 2018-07-26 DIAGNOSIS — S62.022A CLOSED DISPLACED FRACTURE OF MIDDLE THIRD OF SCAPHOID BONE OF LEFT WRIST, INITIAL ENCOUNTER: Primary | ICD-10-CM

## 2018-07-26 DIAGNOSIS — S62.022K CLOSED DISPLACED FRACTURE OF MIDDLE THIRD OF SCAPHOID OF LEFT WRIST WITH NONUNION, SUBSEQUENT ENCOUNTER: ICD-10-CM

## 2018-07-26 DIAGNOSIS — Z47.89 ORTHOPEDIC AFTERCARE: ICD-10-CM

## 2018-07-26 PROCEDURE — 99999 PR PBB SHADOW E&M-EST. PATIENT-LVL III: CPT | Mod: PBBFAC,,, | Performed by: PHYSICIAN ASSISTANT

## 2018-07-26 PROCEDURE — 73110 X-RAY EXAM OF WRIST: CPT | Mod: 26,LT,, | Performed by: RADIOLOGY

## 2018-07-26 PROCEDURE — 73110 X-RAY EXAM OF WRIST: CPT | Mod: TC,FY,LT

## 2018-07-26 PROCEDURE — 99024 POSTOP FOLLOW-UP VISIT: CPT | Mod: S$GLB,,, | Performed by: PHYSICIAN ASSISTANT

## 2018-07-26 NOTE — PROGRESS NOTES
Ms. Cooper is here today for a post-operative visit.  She is 41 days status post Open reduction and internal fixation of left scaphoid wrist fracture by Dr. Brigdes on 6/15/18. She reports that she is doing well with mild pain in the wrist, she reports that it is usually 2-3/10.  She has been using a bone stimulator every day for the past 2-3 weeks. She reports some tingling sensation at the incision site, no finger tingling/numbness.  She denies fever, chills, and sweats since the time of the surgery. She has started vitamin C supplementation.    Physical exam:    Vitals:    07/26/18 0835   BP: 102/71   Pulse: 73   Weight: 49.9 kg (110 lb)   Height: 5' (1.524 m)   PainSc:   3     Vital signs are stable, patient is afebrile.  Patient is well dressed and well groomed, no acute distress.  Alert and oriented to person, place, and time.  Cast taken down.  Incision is clean, dry and intact. Dry skin noted. There is no erythema or exudate.  There is no sign of any infection. She is NVI - difficult to assess motor function due to immobilization.      RADIOLOGY:  Left Wrist X-Ray, 7/26/18  FINDINGS:  Cast material obscures underlying bony detail.  Since the prior exam there has been open reduction internal fixation of a scaphoid fracture.  Metallic screw and fracture fragments appear in reasonable position and alignment.  No new fracture or dislocation identified.      Impression     As above         Assessment: 41 days status post Open reduction and internal fixation of left scaphoid wrist fracture    Plan:  Gabriela was seen today for post-op evaluation.    Diagnoses and all orders for this visit:    Closed displaced fracture of middle third of scaphoid bone of left wrist, initial encounter    Orthopedic aftercare          - continue use of bone stimulator  - New thumb spica cast with bone stimulator port placement  - Discussed cast care  - Follow up in 4 weeks with x-ray  - Start Vitamin C 500 mg daily  - Call with  questions or concerns

## 2018-08-14 ENCOUNTER — DOCUMENTATION ONLY (OUTPATIENT)
Dept: ORTHOPEDICS | Facility: CLINIC | Age: 23
End: 2018-08-14

## 2018-08-14 ENCOUNTER — OFFICE VISIT (OUTPATIENT)
Dept: ORTHOPEDICS | Facility: CLINIC | Age: 23
End: 2018-08-14
Payer: COMMERCIAL

## 2018-08-14 ENCOUNTER — TELEPHONE (OUTPATIENT)
Dept: ORTHOPEDICS | Facility: CLINIC | Age: 23
End: 2018-08-14

## 2018-08-14 DIAGNOSIS — S62.022A CLOSED DISPLACED FRACTURE OF MIDDLE THIRD OF SCAPHOID BONE OF LEFT WRIST, INITIAL ENCOUNTER: Primary | ICD-10-CM

## 2018-08-14 PROCEDURE — 29085 APPL CAST HAND&LWR FOREARM: CPT | Mod: 58,LT,S$GLB, | Performed by: PHYSICIAN ASSISTANT

## 2018-08-14 PROCEDURE — 99024 POSTOP FOLLOW-UP VISIT: CPT | Mod: S$GLB,,, | Performed by: PHYSICIAN ASSISTANT

## 2018-08-14 NOTE — TELEPHONE ENCOUNTER
----- Message from Kortney Rodriguez sent at 8/14/2018 10:05 AM CDT -----  Contact: pt            Name of Who is Calling: FLAKITA GRANDE [[id number]]      What is the request in detail: pt states she is uncomfortable because her cast is very loose.. Please advise      Can the clinic reply by [facility]: no      What [facility]: [phone]

## 2018-08-14 NOTE — PROGRESS NOTES
Patient status post Open reduction and internal fixation of left scaphoid wrist fracture by Dr. Bridges on 6/15/18.  She says that the cast has become loose.  She presents today for cast change.  New left forearm thumb spica fiberglass cast with bone stimulator port placed in clinic today.     Follow-up in 1-2 weeks as scheduled.  Call with any questions or concerns.

## 2018-08-22 ENCOUNTER — TELEPHONE (OUTPATIENT)
Dept: ORTHOPEDICS | Facility: CLINIC | Age: 23
End: 2018-08-22

## 2018-08-23 ENCOUNTER — HOSPITAL ENCOUNTER (OUTPATIENT)
Dept: RADIOLOGY | Facility: OTHER | Age: 23
Discharge: HOME | End: 2018-08-23
Attending: PHYSICIAN ASSISTANT
Payer: COMMERCIAL

## 2018-08-23 ENCOUNTER — OFFICE VISIT (OUTPATIENT)
Dept: ORTHOPEDICS | Facility: CLINIC | Age: 23
End: 2018-08-23
Payer: COMMERCIAL

## 2018-08-23 VITALS
HEART RATE: 90 BPM | BODY MASS INDEX: 21.6 KG/M2 | WEIGHT: 110 LBS | SYSTOLIC BLOOD PRESSURE: 135 MMHG | DIASTOLIC BLOOD PRESSURE: 70 MMHG | HEIGHT: 60 IN

## 2018-08-23 DIAGNOSIS — S62.022A CLOSED DISPLACED FRACTURE OF MIDDLE THIRD OF SCAPHOID BONE OF LEFT WRIST, INITIAL ENCOUNTER: Primary | ICD-10-CM

## 2018-08-23 DIAGNOSIS — S62.022A CLOSED DISPLACED FRACTURE OF MIDDLE THIRD OF SCAPHOID BONE OF LEFT WRIST, INITIAL ENCOUNTER: ICD-10-CM

## 2018-08-23 PROCEDURE — 73110 X-RAY EXAM OF WRIST: CPT | Mod: 26,LT,, | Performed by: RADIOLOGY

## 2018-08-23 PROCEDURE — 99999 PR PBB SHADOW E&M-EST. PATIENT-LVL III: CPT | Mod: PBBFAC,,, | Performed by: PHYSICIAN ASSISTANT

## 2018-08-23 PROCEDURE — 99024 POSTOP FOLLOW-UP VISIT: CPT | Mod: S$GLB,,, | Performed by: PHYSICIAN ASSISTANT

## 2018-08-23 PROCEDURE — 73110 X-RAY EXAM OF WRIST: CPT | Mod: TC,FY,LT

## 2018-08-23 NOTE — PROGRESS NOTES
Ms. Cooper is here today for a post-operative visit.  She is 69 days status post Open reduction and internal fixation of left scaphoid wrist fracture by Dr. Bridges on 6/15/18. She reports that she is doing well with mild pain in the wrist.  She has been using a bone stimulator daily. She reports some tingling sensation at the incision site, no finger tingling/numbness.  She denies fever, chills, and sweats since the time of the surgery. She has started vitamin C supplementation.    Physical exam:    Vitals:    08/23/18 0931   BP: 135/70   Pulse: 90   Weight: 49.9 kg (110 lb)   Height: 5' (1.524 m)   PainSc:   4     Vital signs are stable, patient is afebrile.  Patient is well dressed and well groomed, no acute distress.  Alert and oriented to person, place, and time.  Cast taken down.  Incision is healing well - clean, dry and intact. Dry skin noted. She reports mild tenderness with palpation incision as well as at the anatomical snuffbox.  There is no erythema or exudate.  There is no sign of any infection. She is NVI.  Decreased motion of the thumb and wrist on the left, good finger range of motion. She does report pain with thumb and wrist motion.      RADIOLOGY:  Left Wrist X-Ray, 8/23/18  FINDINGS:  Removal of previous splint.  Postop ORIF navicular fracture stable and satisfactory.  Juxta-articular disuse osteoporosis.      Impression     Healing navicular fracture status post surgical therapy.         Assessment: 69 days status post Open reduction and internal fixation of left scaphoid wrist fracture    Plan:  Gabriela was seen today for pain.    Diagnoses and all orders for this visit:    Closed displaced fracture of middle third of scaphoid bone of left wrist, initial encounter  -     X-Ray Wrist Complete Left; Future  -     Ambulatory Referral to Physical/Occupational Therapy          - continue use of bone stimulator  - Full time use of thumb spica brace, patient has brace from pre-op eval. Zaira  thumb spica splint today to get home to where her brace is.  - Start OT  - No lifting  - Follow up in 4 weeks with x-ray  - Vitamin C 500 mg daily  - Call with questions or concerns

## 2018-08-30 ENCOUNTER — CLINICAL SUPPORT (OUTPATIENT)
Dept: REHABILITATION | Facility: HOSPITAL | Age: 23
End: 2018-08-30
Payer: COMMERCIAL

## 2018-08-30 DIAGNOSIS — M25.532 WRIST PAIN, ACUTE, LEFT: ICD-10-CM

## 2018-08-30 PROCEDURE — 97110 THERAPEUTIC EXERCISES: CPT | Mod: PO

## 2018-08-30 PROCEDURE — 97165 OT EVAL LOW COMPLEX 30 MIN: CPT | Mod: PO

## 2018-08-30 NOTE — PROGRESS NOTES
[facility] Occupational Therapy  Initial Evaluation     Evaluation Date: 8/30/2018  Patient: Gabriela Cooper  YOB: 1995  Chart Number: [id number]  Referring Physician: Marcie Pool PA  Medical Diagnosis: S62.022A (ICD-10-CM) - Closed displaced fracture of middle third of scaphoid bone of left wrist, initial encounter  Therapy Diagnosis:   1. Wrist pain, acute, left          Authorization Period: 12-31-18  Date of Return to MD:  9-20-18    Visit #: 1 of 20   Time In: 10  Time Out: 11  Total Billable Time: 60    Precautions:  Standard    Subjective     Involved Side:  Left wrist / scaphoid   Dominant Side: Right  Date of Onset: Dec 2017 was original injury    Mechanism of Injury: patient fell on floor  History of Current Condition: has had surgery June 2018  Surgical Procedure: Heidi 15, 2018    PROCEDURES:  1.  Open reduction and internal fixation of left scaphoid wrist fracture.  Imaging: see image report   Previous Therapy: N/A     Patient's Goals for Therapy:  To increase motion to return to normal ADLs and IADLs , decrease pain    Pain:  Functional Pain Scale Rating 0-10:   4/10 with use  2/10 without use   4/10 with sleep     Location:  Scaphoid region   Description: Aching  Easing Factors: elevation and bracing     Functional Limitations/Social History:    Previous functional status includes: Independent with all ADLs.     Current FunctionalStatus   Home/Living environment : lives alone      Limitation of Functional Status as follows:   ADLs/IADLs:     - Feeding: I    - Bathing: I    - Dressing: I    - Grooming: I    - Driving: I     Leisure: inability to  swimming, yoga, perform in [employer]  dance team    Occupation:  Full-time student and manager at a [id number]  Working presently: employed  Duties: modified light duty at work dur reistrictions     Past Medical History/Physical Systems Review:   No past medical history on file.  Gabriela Cooper  has a past  surgical history that includes Dilation and curettage of uterus; [place] tooth extraction; and ORIF, WRIST - LEFT SCAPHOID (Left, 6/15/2018).    Gabriela has a current medication list which includes the following prescription(s): hydrocodone-acetaminophen and oxycodone-acetaminophen.    Review of patient's allergies indicates:  No Known Allergies       Objective     Mental status: alert    Observation:   Skin intact and Skin dry    Sensation: Median Nerve Distribution   8/30/2018 8/30/2018    Left Right   Monofilament Testing     Normal 1.65-2.83 Present Present   Diminished Light Touch 3.22-3.61 Present Present   Diminished Protective 3.84-4.31 Present Present   Loss of Protective 4.56-6.65 Present Present   Untestable >6.65 Present Present       Wound Assessment:   Open  Size: about an inch   Location: dorsal hand       Edema: Circumferential measurements: In centimters (minimal)       Range of Motion:   Left Active   8/30/2018   INDEX                          MP Ext/Flex wnl/60                         PIP Ext/Flex wnl/91                         DIP Ext/Flex wnl/75   THUMB                          MP Ext/Flex wnl/0                         IP Ext/Flex wnl/35                         Bertrand ABD 40                          Radial ABD 30 ( unable flatten palm)                         Opposition Thumb to DP of RF     Comments:      Wrist ROM: Left  Active  fisted 8/30/2018   Extension 30   Flexion -15   Radial Deviation 10   Ulnar Deviation 11   Supination 45   Pronation wnl       Comments: straight handed 45 with wrist extension and zero wrist flexion straight hand       No  or pinch strength at this time   Outcome Measure: FOTO    Category: Self Care      Current Score  = 54% impaired, CK = at least 40% but < 60% impaired, limited or restricted  Goal at Discharge Score =  35% impaired CJ = at least 20% but < 40% impaired, limited or restricted    Score interpretation is as follows:   TEST SCORE  Modifier   Impairment Limitation Restriction    0%  CH  0 % impaired, limited or restricted    1-19%  CI  @ least 1% but less than 20% impaired, limited or restricted    20-39%  CJ  @ least 20%<40% impaired, limited or restricted    40-59%  CK  @ least 40%<60% impaired, limited or restricted    60-79%  CL  @ least 60% <80% impaired, limited or restricted    80-99%  CM  @ least 80%<100% impaired limited or restricted    100%  CN  100% impaired, limited or restricted             Treatment     Treatment Time In/out  (separate from total time) : 10 minutes at the end of the hour       Issued HEP and educated on modality use for pain management (see patient instructions). Pt verbally understood the instructions as they were given and demonstrated proper form and technique during therapy. Pt was advise to perform these exercises free of pain, and to stop performing them if pain occurs.    See PI in note section for detail HEP    Patient/Family Education: role of OT, goals for OT, scheduling/cancellations - pt verbalized understanding. Discussed insurance limitations with patient.      Assessment       Anticipated barriers to occupational therapy:   Pt has no cultural, educational or language barriers to learning provided.    Profile and History Assessment of Occupational Performance Level of Clinical Decision Making Complexity Score   Occupational Profile:   Gabriela Cooper is a 22 y.o. female who lives alone and is currently employed as manager [employer]  MRN:[id number]  CSN:[id number]  [employer] . Gabriela Cooper has difficulty with  feeding, bathing, grooming and dressing  shopping  affecting his/her daily functional abilities. His/her main goal for therapy is increase motion .     Comorbidities:   young age    Medical and Therapy History Review:   Brief               Performance Deficits    Physical:  Joint Mobility  Joint Stability  Muscle Endurance  Fine Motor Coordination    Cognitive:  No  Deficits    Psychosocial:    No Deficits     Clinical Decision Making:  high    Assessment Process:  Comprehensive Assessments    Modification/Need for Assistance:  Significant Modifications/Assistance    Intervention Selection:  Several Treatment Options       low  Based on PMHX, co morbidities , data from assessments and functional level of assistance required with task and clinical presentation directly impacting function.     Assessment  This 22 y.o. female referred to Outpatient Occupational Therapy with diagnosis of   Encounter Diagnosis   Name Primary?    Wrist pain, acute, left     presents with limitations as described in problem list. Patient can benefit from Occupational Therapy services for Iontophoresis, Ultrasound, moist heat, PROM, AAROM, AROM, Theraputic exercises, joint mobs, home exercise program provied with written instructions and ice. The following goals were discussed with the patient and she is in agreement with them as to be addressed in the treatment plan.     Problem List:   Decreased function of Left UE, Decreased ROM, Increased pain, Decreased strength, Muscular atrophy, Inability to perform work/tasks and Inability to perform leisure activiites      Goals:     Goals to be met in 6-8  weeks:    1) Patient to be IND with HEP and modalities for pain managment.  2) Patient will  Increase AROM 15-20 degrees in hand/wrist to increase functional hand use for ADLs/work/leisure activities.  3) Patient will decrease complaints of pain to  2 out of 10 to increase functional hand use for ADL/work/leisure activities.       Plan     Plan  Gabriela to be treated by Occupational Therapy 1-2  times per week for 60 days during the certification period from   8-30-18   to 10-30-18  to achieve the established goals.         Chana Begum, LAUREN,  OTR/L, CHT  Occupational therapist, Certified Hand Therapist

## 2018-08-30 NOTE — PLAN OF CARE
[facility] Occupational Therapy  Initial Evaluation     Evaluation Date: 8/30/2018  Patient: Gabriela Cooper  YOB: 1995  Chart Number: [id number]  Referring Physician: Marcie Pool PA  Medical Diagnosis: S62.022A (ICD-10-CM) - Closed displaced fracture of middle third of scaphoid bone of left wrist, initial encounter  Therapy Diagnosis:   1. Wrist pain, acute, left          Authorization Period: 12-31-18  Date of Return to MD:  9-20-18    Visit #: 1 of 20   Time In: 10  Time Out: 11  Total Billable Time: 60    Precautions:  Standard    Subjective     Involved Side:  Left wrist / scaphoid   Dominant Side: Right  Date of Onset: Dec 2017 was original injury    Mechanism of Injury: patient fell on floor  History of Current Condition: has had surgery June 2018  Surgical Procedure: Heidi 15, 2018    PROCEDURES:  1.  Open reduction and internal fixation of left scaphoid wrist fracture.  Imaging: see image report   Previous Therapy: N/A     Patient's Goals for Therapy:  To increase motion to return to normal ADLs and IADLs , decrease pain    Pain:  Functional Pain Scale Rating 0-10:   4/10 with use  2/10 without use   4/10 with sleep     Location:  Scaphoid region   Description: Aching  Easing Factors: elevation and bracing     Functional Limitations/Social History:    Previous functional status includes: Independent with all ADLs.     Current FunctionalStatus   Home/Living environment : lives alone      Limitation of Functional Status as follows:   ADLs/IADLs:     - Feeding: I    - Bathing: I    - Dressing: I    - Grooming: I    - Driving: I     Leisure: inability to  swimming, yoga, perform in [employer]  dance team    Occupation:  Full-time student and manager at a [employer]  Working presently: employed  Duties: modified light duty at work dur reistrictions     Past Medical History/Physical Systems Review:   No past medical history on file.  Gabriela Cooper  has a past  surgical history that includes Dilation and curettage of uterus; [place] tooth extraction; and ORIF, WRIST - LEFT SCAPHOID (Left, 6/15/2018).    Gabriela has a current medication list which includes the following prescription(s): hydrocodone-acetaminophen and oxycodone-acetaminophen.    Review of patient's allergies indicates:  No Known Allergies       Objective     Mental status: alert    Observation:   Skin intact and Skin dry    Sensation: Median Nerve Distribution   8/30/2018 8/30/2018    Left Right   Monofilament Testing     Normal 1.65-2.83 Present Present   Diminished Light Touch 3.22-3.61 Present Present   Diminished Protective 3.84-4.31 Present Present   Loss of Protective 4.56-6.65 Present Present   Untestable >6.65 Present Present       Wound Assessment:   Open  Size: about an inch   Location: dorsal hand       Edema: Circumferential measurements: In centimters (minimal)       Range of Motion:   Left Active   8/30/2018   INDEX                          MP Ext/Flex wnl/60                         PIP Ext/Flex wnl/91                         DIP Ext/Flex wnl/75   THUMB                          MP Ext/Flex wnl/0                         IP Ext/Flex wnl/35                         Bertrand ABD 40                          Radial ABD 30 ( unable flatten palm)                         Opposition Thumb to DP of RF     Comments:      Wrist ROM: Left  Active  fisted 8/30/2018   Extension 30   Flexion -15   Radial Deviation 10   Ulnar Deviation 11   Supination 45   Pronation wnl       Comments: straight handed 45 with wrist extension and zero wrist flexion straight hand       No  or pinch strength at this time   Outcome Measure: FOTO    Category: Self Care      Current Score  = 54% impaired, CK = at least 40% but < 60% impaired, limited or restricted  Goal at Discharge Score =  35% impaired CJ = at least 20% but < 40% impaired, limited or restricted    Score interpretation is as follows:   TEST SCORE  Modifier   Impairment Limitation Restriction    0%  CH  0 % impaired, limited or restricted    1-19%  CI  @ least 1% but less than 20% impaired, limited or restricted    20-39%  CJ  @ least 20%<40% impaired, limited or restricted    40-59%  CK  @ least 40%<60% impaired, limited or restricted    60-79%  CL  @ least 60% <80% impaired, limited or restricted    80-99%  CM  @ least 80%<100% impaired limited or restricted    100%  CN  100% impaired, limited or restricted             Treatment     Treatment Time In/out  (separate from total time) : 10 minutes at the end of the hour       Issued HEP and educated on modality use for pain management (see patient instructions). Pt verbally understood the instructions as they were given and demonstrated proper form and technique during therapy. Pt was advise to perform these exercises free of pain, and to stop performing them if pain occurs.    See PI in note section for detail HEP    Patient/Family Education: role of OT, goals for OT, scheduling/cancellations - pt verbalized understanding. Discussed insurance limitations with patient.      Assessment       Anticipated barriers to occupational therapy:   Pt has no cultural, educational or language barriers to learning provided.    Profile and History Assessment of Occupational Performance Level of Clinical Decision Making Complexity Score   Occupational Profile:   Gabriela Cooper is a 22 y.o. female who lives alone and is currently employed as manager [employer]  MRN:[id number]  CSN:[id number]  [facility] . Gabriela Cooper has difficulty with  feeding, bathing, grooming and dressing  shopping  affecting his/her daily functional abilities. His/her main goal for therapy is increase motion .     Comorbidities:   young age    Medical and Therapy History Review:   Brief               Performance Deficits    Physical:  Joint Mobility  Joint Stability  Muscle Endurance  Fine Motor Coordination    Cognitive:  No  Deficits    Psychosocial:    No Deficits     Clinical Decision Making:  high    Assessment Process:  Comprehensive Assessments    Modification/Need for Assistance:  Significant Modifications/Assistance    Intervention Selection:  Several Treatment Options       low  Based on PMHX, co morbidities , data from assessments and functional level of assistance required with task and clinical presentation directly impacting function.     Assessment  This 22 y.o. female referred to Outpatient Occupational Therapy with diagnosis of   Encounter Diagnosis   Name Primary?    Wrist pain, acute, left     presents with limitations as described in problem list. Patient can benefit from Occupational Therapy services for Iontophoresis, Ultrasound, moist heat, PROM, AAROM, AROM, Theraputic exercises, joint mobs, home exercise program provied with written instructions and ice. The following goals were discussed with the patient and she is in agreement with them as to be addressed in the treatment plan.     Problem List:   Decreased function of Left UE, Decreased ROM, Increased pain, Decreased strength, Muscular atrophy, Inability to perform work/tasks and Inability to perform leisure activiites      Goals:     Goals to be met in 6-8  weeks:    1) Patient to be IND with HEP and modalities for pain managment.  2) Patient will  Increase AROM 15-20 degrees in hand/wrist to increase functional hand use for ADLs/work/leisure activities.  3) Patient will decrease complaints of pain to  2 out of 10 to increase functional hand use for ADL/work/leisure activities.       Plan     Plan  Gabriela to be treated by Occupational Therapy 1-2  times per week for 60 days during the certification period from   8-30-18   to 10-30-18  to achieve the established goals.         Chana Begum, LAUREN,  OTR/L, CHT  Occupational therapist, Certified Hand Therapist

## 2018-08-30 NOTE — PATIENT INSTRUCTIONS
Tendon Glides and Joint Blocking        Start at position A and move through each position slowly attempting to achieve full glide.  A-E is ONE repetition.     Complete 10 reps at 6-8 times per day.     PIP Flexion (Active Blocked)        Hold large knuckle straight using other hand. Bend middle joint of limited finger as far as possible.  Repeat 10 times. Do 6-8 sessions per day.            DIP Flexion (Active Blocked)        Hold the  finger firmly at the middle so that only the tip joint can bend.  Repeat 10 times. Do 6-8  sessions per day.  Copyright © I. All rights reserved.            AROM: Wrist Extension        With right palm down, bend wrist up.  Repeat __15__ times per set.  Do __5__ sessions per day.    Copyright © I. All rights reserved.   AROM: Wrist Flexion        With right palm up, bend wrist up.  Repeat __15__ times per set. Do _5___ sessions per day.    Copyright © [employer]. All rights reserved.   AROM: Wrist Radial / Ulnar Deviation Against Gravity        With thumb toward face, gently bend left wrist toward body, then away. Keep elbow bent and supported.  Repeat __15__ times per set.  Do __5__ sessions per day.  Copyright © [employer]. All rights reserved.     Forearm Supinators        Child sits with back straight, left elbow against side and bent at 90°, thumb up.  Resist while child attempts to turn palm up.  Do not let elbow straighten or move away from body.  Hold __3__ seconds. Repeat __15__ times. Do __5__ sessions per day.  CAUTION: Pressure should be slow and steady.    Copyright © [employer]. All rights reserved.       AROM: Thumb Abduction / Adduction        Actively pull right thumb away from palm as far as possible. Hold _3___ seconds. Then bring thumb back to touch fingers. Try not to bend fingers toward thumb.  Repeat __15__ times per set.  Do __5__ sessions per day.    Copyright © I. All rights reserved.   Opposition (Active)        Touch tip of thumb to nail tip of each finger in turn,  "making an "O" shape.  Repeat _15___ times. Do _5___ sessions per day.    Copyright © [facility]. All rights reserved.   AROM: Thumb Flexion / Extension        Actively bend right thumb across palm as far as possible. Hold _3__ seconds. Relax. Then pull thumb back into hitchhike position.  Repeat __15__ times per set.  Do __5__ sessions per day.    Copyright © [employer]. All rights reserved.   IP Flexion (Active Blocked)        Brace thumb below tip joint. Bend joint as far as possible.  Repeat __15__ times. Do __5__ sessions per day.    Copyright © [employer]. All rights reserved.   Composite Extension (Active)        Bring thumb up and out in hitchhiker position. Hold __3__ seconds.  Repeat ___15_ times. Do __5__ sessions per day.    Copyright © [employer]. All rights reserved.   MP Extension (Active)        With palm on table, lift thumb up. Hold _3___ seconds. Relax and lower thumb.  Repeat ____ times. Do _5___ sessions per day.  15           Radial Adduction/Abduction (Active)        Move thumb out to side. Move back alongside index finger.  Repeat 15 times. Do 5 sessions per day.                  "

## 2018-09-05 ENCOUNTER — CLINICAL SUPPORT (OUTPATIENT)
Dept: REHABILITATION | Facility: HOSPITAL | Age: 23
End: 2018-09-05
Payer: COMMERCIAL

## 2018-09-05 DIAGNOSIS — M25.532 WRIST PAIN, ACUTE, LEFT: Primary | ICD-10-CM

## 2018-09-05 PROCEDURE — 97110 THERAPEUTIC EXERCISES: CPT | Mod: PO

## 2018-09-05 PROCEDURE — 97022 WHIRLPOOL THERAPY: CPT | Mod: PO

## 2018-09-05 PROCEDURE — 97035 APP MDLTY 1+ULTRASOUND EA 15: CPT | Mod: PO

## 2018-09-05 NOTE — PROGRESS NOTES
Occupational Therapy Daily Treatment Note     Name: Gabriela Cooper  [facility] Number: [id number]    Therapy Diagnosis:   Encounter Diagnosis   Name Primary?    Wrist pain, acute, left Yes     Physician: Marcie Pool PA      Surgical Procedure: Heidi 15, 2018  Visit # / Visits authorized: 2/ 10  Date of Return to MD: 9-20-18      Time In:2  Time Out: 3    Total Billable Time: 60 minutes    Precautions: Standard    Subjective     Pt reports: she was compliant with home exercise program given last session.   Response to previous treatment: has increased motion with HEP      Pain: 3/10  Location: wrist     Objective     -N/A  Gabriela received the following direct contact modalities after being cleared for contraindications for 8 minutes:  -Patient receives ultrasound  for pain control and remold scar tissue to increase tissue elasticity @ 20 duty cycle, 3.3 Mhz, applied to dorsal left scar/scaphoid region, intensity = 0.6 w/cm2 for 8 minutes    Gabriela received the following manual therapy techniques for 8 minutes:     -Performed scar massage to  Dorsal scar area for 8 minutes with mini-vibrator to decrease adhesions and improve tensile glide.       Gabriela participated in dynamic functional therapeutic exercises to improve functional performance for 20  minutes, including:  In fluidotherapy machine x 2 minutes each  -all wrist motions , circumduction both directions  - thumb slides, thumb ABD ( olvera and radial)     Home Exercises and Education Provided       Written Home Exercises Provided: .  Exercises were reviewed and Gabriela was able to demonstrate them prior to the end of the session.  Gabriela demonstrated good  understanding of the education provided.     Pt received a written copy of exercises to perform at home.   See EMR under patient instructions for exercises given.     Gabriela demonstrated good  understanding of the education provided.       Assessment     Pt would  continue to benefit from skilled OT. Pt to begin scar massaging 2-3 minutes twice a day.   Gabriela is progressing well towards her goals and there are no updates to goals at this time. Pt prognosis is Good.    Pt continues to be limited in functional and leisurely pursuits. Pain limits patients participation in ADL's. Pt is not able to carryout necessary vocational tasks. Patient continues to requires cues and skilled supervision to complete HEP     Pt will continue to benefit from skilled outpatient occupational therapy to address the deficits listed in the problem list on initial evaluation provide pt/family education and to maximize pt's level of independence in the home and community environment.     Anticipated barriers to occupational therapy: n/a    Pt's spiritual, cultural and educational needs considered and pt agreeable to plan of care and goals.    Goals:  Cont goals from POC    Plan   Continue 1-2x week x 6-8  weeks during the certification period from 8-30-18  to 10-30-18  in pursuit of OT goals.    Discussed Plan of Care with patient: Yes  Updates/Grading for next session: cont       Chana Begum, OT , CHT

## 2018-09-11 ENCOUNTER — CLINICAL SUPPORT (OUTPATIENT)
Dept: REHABILITATION | Facility: HOSPITAL | Age: 23
End: 2018-09-11
Payer: COMMERCIAL

## 2018-09-11 DIAGNOSIS — M25.531 RIGHT WRIST PAIN: ICD-10-CM

## 2018-09-11 DIAGNOSIS — R29.898 WEAKNESS OF WRIST: ICD-10-CM

## 2018-09-11 PROBLEM — M25.539 WRIST PAIN: Status: RESOLVED | Noted: 2018-09-11 | Resolved: 2018-09-11

## 2018-09-11 PROBLEM — M25.539 WRIST PAIN: Status: ACTIVE | Noted: 2018-09-11

## 2018-09-11 PROBLEM — M25.532 LEFT WRIST PAIN: Status: ACTIVE | Noted: 2018-09-11

## 2018-09-11 PROCEDURE — 97035 APP MDLTY 1+ULTRASOUND EA 15: CPT | Mod: PO

## 2018-09-11 PROCEDURE — 97022 WHIRLPOOL THERAPY: CPT | Mod: PO

## 2018-09-11 PROCEDURE — 97110 THERAPEUTIC EXERCISES: CPT | Mod: PO

## 2018-09-11 NOTE — PROGRESS NOTES
Occupational Therapy Daily Treatment Note     Name: Gabriela Cooper  [facility] Number: [id number]    Therapy Diagnosis:   Encounter Diagnoses   Name Primary?    Weakness of wrist     Right wrist pain      Physician: Marcie Pool PA      Surgical Procedure: Heidi 15, 2018  Visit # / Visits authorized: 2/ 20  Date of Return to MD: 9-20-18      Time In:2  Time Out: 3    Total Billable Time: 60 minutes    Precautions: Standard    Subjective     Pt reports: she was compliant with home exercise program given last session.   Response to previous treatment: has increased motion with HEP and less pain      Pain: 2/10  Location: dorsal  wrist     Objective     -N/A  Gabriela received the following direct contact modalities after being cleared for contraindications for 8 minutes:  -Patient receives ultrasound  for pain control and remold scar tissue to increase tissue elasticity @ 20 duty cycle, 3.3 Mhz, applied to dorsal left scar/scaphoid region, intensity = 0.6 w/cm2 for 8 minutes    Gabriela received the following manual therapy techniques for 8 minutes:     -Performed scar massage to  Dorsal scar area for 8 minutes with mini-vibrator to decrease adhesions and improve tensile glide.       Gabriela participated in dynamic functional therapeutic exercises to improve functional performance for 20  minutes, including:  In fluidotherapy machine x 2 minutes each  -all wrist motions , circumduction both directions  - thumb slides, thumb ABD ( olvera and radial)     Home Exercises and Education Provided       Written Home Exercises Provided: .  Exercises were reviewed and Gabriela was able to demonstrate them prior to the end of the session.  Gabriela demonstrated good  understanding of the education provided.     Pt received a written copy of exercises to perform at home.   See EMR under patient instructions for exercises given.     Gabriela demonstrated good  understanding of the education  provided.       Assessment   Pt has arrived with about 10-15 degrees more motion with all wrist planes. Flexion is atill most limited at 20 degress while fisting, however, this is still a progression.  Pt would continue to benefit from skilled OT.   Gabriela is progressing well towards her goals and there are no updates to goals at this time. Pt prognosis is Good.    Pt continues to be limited in functional and leisurely pursuits. Pain limits patients participation in ADL's. Patient continues to requires cues and skilled supervision to complete HEP     Pt will continue to benefit from skilled outpatient occupational therapy to address the deficits listed in the problem list on initial evaluation provide pt/family education and to maximize pt's level of independence in the home and community environment.     Anticipated barriers to occupational therapy: n/a    Pt's spiritual, cultural and educational needs considered and pt agreeable to plan of care and goals.    Goals:  Cont goals from POC    Plan   Continue 1-2x week x 6-8  weeks during the certification period from 8-30-18  to 10-30-18  in pursuit of OT goals.    Discussed Plan of Care with patient: Yes  Updates/Grading for next session: cont       Chana Begum, OT , CHT

## 2018-09-13 ENCOUNTER — CLINICAL SUPPORT (OUTPATIENT)
Dept: REHABILITATION | Facility: HOSPITAL | Age: 23
End: 2018-09-13
Payer: COMMERCIAL

## 2018-09-13 DIAGNOSIS — M25.532 WRIST PAIN, ACUTE, LEFT: ICD-10-CM

## 2018-09-13 DIAGNOSIS — R29.898 WEAKNESS OF WRIST: Primary | ICD-10-CM

## 2018-09-13 PROCEDURE — 97110 THERAPEUTIC EXERCISES: CPT | Mod: PO

## 2018-09-13 PROCEDURE — 97035 APP MDLTY 1+ULTRASOUND EA 15: CPT | Mod: PO

## 2018-09-13 NOTE — PROGRESS NOTES
Occupational Therapy Daily Treatment Note     Name: Gabriela Cooper  [facility] Number: [id number]    Therapy Diagnosis:   Encounter Diagnoses   Name Primary?    Weakness of wrist Yes    Wrist pain, acute, left      Physician: Marcie Pool PA      Surgical Procedure: Heidi 15, 2018  Visit # / Visits authorized: 2/ 20  Date of Return to MD: 9-20-18      Time In:2  Time Out: 3    Total Billable Time: 60 minutes    Precautions: Standard    Subjective     Pt reports: she is performing HEP 5 times  Day. She feels soreness, but no pain.    Response to previous treatment: has increased motion with HEP and less pain      Pain: 2/10  Location: dorsal  wrist     Objective     -N/A  Gabriela received the following direct contact modalities after being cleared for contraindications for 8 minutes:  -Patient receives ultrasound  for pain control and remold scar tissue to increase tissue elasticity @ 20 duty cycle, 3.3 Mhz, applied to dorsal left scar/scaphoid region, intensity = 0.6 w/cm2 for 8 minutes    Gabriela received the following manual therapy techniques for 8 minutes:     -Performed scar massage to  Dorsal scar area for 8 minutes with mini-vibrator to decrease adhesions and improve tensile glide.       Gabriela participated in dynamic functional therapeutic exercises to improve functional performance for 20  minutes, including:  In fluidotherapy machine x 2 minutes each  -all wrist motions , circumduction both directions  - thumb slides, thumb ABD ( olvera and radial)     Home Exercises and Education Provided       Written Home Exercises Provided: .  Exercises were reviewed and Gabriela was able to demonstrate them prior to the end of the session.  Gabriela demonstrated good  understanding of the education provided.     Pt received a written copy of exercises to perform at home.   See EMR under patient instructions for exercises given.     Gabriela demonstrated good  understanding of the  education provided.       Assessment   Pt with more extension than flexion in wrist. Stiffness noted . Supplied dycem for scar remodeling and increase elasticity.  Pt would continue to benefit from skilled OT.   Gabriela is progressing well towards her goals and there are no updates to goals at this time. Pt prognosis is Good.    Pt continues to be limited in functional and leisurely pursuits. Pain limits patients participation in ADL's. Patient continues to requires cues and skilled supervision to complete HEP     Pt will continue to benefit from skilled outpatient occupational therapy to address the deficits listed in the problem list on initial evaluation provide pt/family education and to maximize pt's level of independence in the home and community environment.     Anticipated barriers to occupational therapy: n/a    Pt's spiritual, cultural and educational needs considered and pt agreeable to plan of care and goals.    Goals:  Cont goals from POC    Plan   Continue 1-2x week x 6-8  weeks during the certification period from 8-30-18  to 10-30-18  in pursuit of OT goals.    Discussed Plan of Care with patient: Yes  Updates/Grading for next session: cont       Chana Begum, OT , CHT

## 2018-09-18 ENCOUNTER — CLINICAL SUPPORT (OUTPATIENT)
Dept: REHABILITATION | Facility: HOSPITAL | Age: 23
End: 2018-09-18
Payer: COMMERCIAL

## 2018-09-18 DIAGNOSIS — M25.532 WRIST PAIN, ACUTE, LEFT: Primary | ICD-10-CM

## 2018-09-18 PROCEDURE — 97035 APP MDLTY 1+ULTRASOUND EA 15: CPT | Mod: PO

## 2018-09-18 PROCEDURE — 97110 THERAPEUTIC EXERCISES: CPT | Mod: PO

## 2018-09-18 PROCEDURE — 97022 WHIRLPOOL THERAPY: CPT | Mod: PO

## 2018-09-18 NOTE — PROGRESS NOTES
Occupational Therapy Daily Treatment Note     Name: Gabriela Cooper  [facility] Number: [id number]    Therapy Diagnosis:   Encounter Diagnosis   Name Primary?    Wrist pain, acute, left Yes     Physician: Marcie Pool PA      Surgical Procedure: Heidi 15, 2018  Visit # / Visits authorized: 2/ 20  Date of Return to MD: 9-20-18      Time In:3  Time Out: 4    Total Billable Time: 60 minutes    Precautions: Standard    Subjective     Pt reports: she is having soreness at end of the day and during her HEP. Sleeping with brace.   Response to previous treatment: has some soreness       Pain: 2/10  Location: dorsal wrist and thumb     Objective       Gabriela received the following direct contact modalities after being cleared for contraindications for 8 minutes:  -Patient receives ultrasound  for pain control and remold scar tissue to increase tissue elasticity @ 20 duty cycle, 3.3 Mhz, applied to dorsal left scar/scaphoid region, intensity = 0.6 w/cm2 for 8 minutes        Gabriela participated in dynamic functional therapeutic exercises to improve functional performance for 30  minutes, including:  In fluidotherapy machine x 2 minutes each  -all wrist motions , circumduction both directions  - thumb slides, thumb ABD ( olvera and radial)   - gentle pain-free clothespin pinch ( yellow) x 2 minutes for lateral and 3 jaw      THUMB AROM                         MP Ext/Flex wnl/0                         IP Ext/Flex Wnl/66 (+31)                         Olvera ABD 52 (+12)                         Radial ABD 50 (+20)                         Opposition Thumb to P2 of SF       Comments: wrist flexion was -15  On evaluation date, patient can now flatten hand to properly measure radial ABD.    Wrist ROM: Left  Active  fisted 8/30/2018   Extension 40(+10)   Flexion 29(+44)   Radial Deviation 13   Ulnar Deviation 17 (+6)   Supination 68(+23)   Pronation wnl       Home Exercises and Education Provided        Written Home Exercises Provided: .  Exercises were reviewed and Gabriela was able to demonstrate them prior to the end of the session.  Gabriela demonstrated good  understanding of the education provided.     Pt received a written copy of exercises to perform at home.   See EMR under patient instructions for exercises given.     Gabriela demonstrated good  understanding of the education provided.       Assessment   Pt improved with AROM , still not lifting and wearing brace.  Pt would continue to benefit from skilled OT.   Gabriela is progressing well towards her goals and there are no updates to goals at this time. Pt prognosis is Good.    Pt continues to be limited in functional and leisurely pursuits. Pain limits patients participation in ADL's. Patient continues to requires cues and skilled supervision to complete HEP     Pt will continue to benefit from skilled outpatient occupational therapy to address the deficits listed in the problem list on initial evaluation provide pt/family education and to maximize pt's level of independence in the home and community environment.     Anticipated barriers to occupational therapy: n/a    Pt's spiritual, cultural and educational needs considered and pt agreeable to plan of care and goals.    Goals:  Cont goals from POC    Plan   Continue 1-2x week x 6-8  weeks during the certification period from 8-30-18  to 10-30-18  in pursuit of OT goals.    Discussed Plan of Care with patient: Yes  Updates/Grading for next session: cont       Chana Begum, OT , CHT

## 2018-09-20 ENCOUNTER — HOSPITAL ENCOUNTER (OUTPATIENT)
Dept: RADIOLOGY | Facility: OTHER | Age: 23
Discharge: HOME | End: 2018-09-20
Attending: PHYSICIAN ASSISTANT
Payer: COMMERCIAL

## 2018-09-20 ENCOUNTER — OFFICE VISIT (OUTPATIENT)
Dept: ORTHOPEDICS | Facility: CLINIC | Age: 23
End: 2018-09-20
Payer: COMMERCIAL

## 2018-09-20 VITALS
BODY MASS INDEX: 21.6 KG/M2 | HEART RATE: 85 BPM | SYSTOLIC BLOOD PRESSURE: 120 MMHG | HEIGHT: 60 IN | DIASTOLIC BLOOD PRESSURE: 84 MMHG | WEIGHT: 110 LBS

## 2018-09-20 DIAGNOSIS — S62.022A CLOSED DISPLACED FRACTURE OF MIDDLE THIRD OF SCAPHOID BONE OF LEFT WRIST, INITIAL ENCOUNTER: Primary | ICD-10-CM

## 2018-09-20 DIAGNOSIS — Z47.89 ORTHOPEDIC AFTERCARE: ICD-10-CM

## 2018-09-20 DIAGNOSIS — S62.022A CLOSED DISPLACED FRACTURE OF MIDDLE THIRD OF SCAPHOID BONE OF LEFT WRIST, INITIAL ENCOUNTER: ICD-10-CM

## 2018-09-20 PROCEDURE — 73110 X-RAY EXAM OF WRIST: CPT | Mod: 26,LT,, | Performed by: RADIOLOGY

## 2018-09-20 PROCEDURE — 99999 PR PBB SHADOW E&M-EST. PATIENT-LVL III: CPT | Mod: PBBFAC,,, | Performed by: PHYSICIAN ASSISTANT

## 2018-09-20 PROCEDURE — 99024 POSTOP FOLLOW-UP VISIT: CPT | Mod: S$GLB,,, | Performed by: PHYSICIAN ASSISTANT

## 2018-09-20 PROCEDURE — 73110 X-RAY EXAM OF WRIST: CPT | Mod: TC,FY,LT

## 2018-09-20 NOTE — PROGRESS NOTES
Ms. Cooper is here today for a post-operative visit.  She is 3 months status post Open reduction and internal fixation of left scaphoid wrist fracture by Dr. Bridges on 6/15/18. She reports that she is doing well with mild pain in the wrist.  She has been using a bone stimulator daily. She has been attending OT and performing her home exercises, she said that it feels stiff in the mornings but more loose in the evening.  She denies fever, chills, and sweats since the time of the surgery.     Physical exam:    Vitals:    09/20/18 0948   BP: 120/84   Pulse: 85   Weight: 49.9 kg (110 lb)   Height: 5' (1.524 m)   PainSc:   2     Vital signs are stable, patient is afebrile.  Patient is well dressed and well groomed, no acute distress.  Alert and oriented to person, place, and time.  Incision is healing well - clean, dry and intact.  She reports mild tenderness with palpation of the incision. Non-tender at the anatomical snuffbox.  There is no erythema or exudate.  There is no sign of any infection. She is NVI.  Decreased motion of the thumb and wrist on the left, improving. Good finger range of motion. She does report pain with thumb and wrist motion.      RADIOLOGY:  Left Wrist X-Ray, 9/20/18  FINDINGS:  Unchanged alignment of the surgically fixed scaphoid bone.  Fracture plane still identified through the interpolar scaphoid.  No perihardware lucency to suggest loosening or infection.  Mild periarticular osteopenia of the distal radius.  No acute fractures.      Impression     Unchanged appearance of the fixed scaphoid fracture       Assessment: 3 months status post Open reduction and internal fixation of left scaphoid wrist fracture    Plan:  Gabriela was seen today for pain.    Diagnoses and all orders for this visit:    Closed displaced fracture of middle third of scaphoid bone of left wrist, initial encounter  -     X-Ray Wrist Complete Left; Future    Orthopedic aftercare          - continue use of bone  stimulator  - Wean out of thumb spica brace  - Continue OT and HEP  - No lifting  - Follow up 4 weeks with X-Ray  - Vitamin C 500 mg daily  - Call with questions or concerns

## 2018-09-21 ENCOUNTER — CLINICAL SUPPORT (OUTPATIENT)
Dept: REHABILITATION | Facility: HOSPITAL | Age: 23
End: 2018-09-21
Payer: COMMERCIAL

## 2018-09-21 DIAGNOSIS — M25.532 WRIST PAIN, ACUTE, LEFT: Primary | ICD-10-CM

## 2018-09-21 PROCEDURE — 97110 THERAPEUTIC EXERCISES: CPT | Mod: PO

## 2018-09-21 PROCEDURE — 97022 WHIRLPOOL THERAPY: CPT | Mod: PO

## 2018-09-21 PROCEDURE — 97035 APP MDLTY 1+ULTRASOUND EA 15: CPT | Mod: PO

## 2018-09-21 NOTE — PROGRESS NOTES
Occupational Therapy Daily Treatment Note     Name: Gabriela Cooper  [facility] Number: [id number]    Therapy Diagnosis:   Encounter Diagnosis   Name Primary?    Wrist pain, acute, left Yes     Physician: Marcie Pool PA      Surgical Procedure: Heidi 15, 2018  Visit # / Visits authorized: 6 / 20  Date of Return to MD: 9-20-18      Time In:9  Time Out: 10    Total Billable Time: 60 minutes    Precautions: Standard    Subjective     Pt reports: she is1/2wt#   Little pain , only soreness. Started 1/2#wt today , no pain after session, just demo tremoring  Response to previous treatment: has some soreness       Pain: 2/10  Location: dorsal wrist and thumb     Objective       Gabriela received the following direct contact modalities after being cleared for contraindications for 8 minutes:  -Patient receives ultrasound  for pain control and remold scar tissue to increase tissue elasticity @ 20 duty cycle, 3.3 Mhz, applied to dorsal left scar/scaphoid region, intensity = 0.6 w/cm2 for 8 minutes        Gabriela participated in dynamic functional therapeutic exercises to improve functional performance for 30  minutes, including:  In fluidotherapy machine x 2 minutes each  -all wrist motions , circumduction both directions  - thumb slides x 2 minutes   - gentle pain-free clothespin pinch ( yellow) x 2 minutes for lateral and 3 jaw      Out of fludiotherpay machine:  - 1/2#wt x 10 reps for each wrist motion ( and sup/pro)  - thumb ip flexion with AAROM x 15 reps  - elbow flexion with 1/2#wt x 10 reps  Triceps with 1/3#wt x 10 reps    THUMB AROM                         MP Ext/Flex wnl/51                         IP Ext/Flex Wnl/70                         Bertrand ABD 52 (+12)                         Radial ABD 50 (+20)                         Opposition Thumb to P2 of SF       Comments: wrist flexion was -15  On evaluation date, patient can now flatten hand to properly measure radial ABD.    Wrist  ROM: Left  Active  fisted 9/21/2018   Extension 40(+10)   Flexion 29(+44)   Radial Deviation 13   Ulnar Deviation 17 (+6)   Supination 68(+23)   Pronation wnl       Home Exercises and Education Provided       Written Home Exercises Provided:    Exercises were reviewed and Gabriela was able to demonstrate them prior to the end of the session.  Gabriela demonstrated good  understanding of the education provided.     Pt received a written copy of exercises to perform at home.   See EMR under patient instructions for exercises given.     Gabriela demonstrated good  understanding of the education provided.       Assessment   Pt starting supervised weight bearing with yellow 1#pressure closepin and use of 1/2#wt for wrist motions for only 10 reps each in all wrist and elbow planes of motion.  Pt would continue to benefit from skilled OT. Pt instructed to slowly wean out of brace for seated school task, watching tv, and gentle fine motor task that does not require pressure of thumb, she agreed to this.      Gabriela is progressing well towards her goals and there are no updates to goals at this time. Pt prognosis is Good.    Pt continues to be limited in functional and leisurely pursuits. Pain limits patients participation in ADL's. Patient continues to requires cues and skilled supervision to complete HEP     Pt will continue to benefit from skilled outpatient occupational therapy to address the deficits listed in the problem list on initial evaluation provide pt/family education and to maximize pt's level of independence in the home and community environment.     Anticipated barriers to occupational therapy: n/a    Pt's spiritual, cultural and educational needs considered and pt agreeable to plan of care and goals.    Goals:     Goals to be met in 6-8  weeks:    1) Patient to be IND with HEP and modalities for pain managment. (met)  2) Patient will  Increase AROM 15-20 degrees in hand/wrist to increase functional hand  use for ADLs/work/leisure activities.(progressing)  3) Patient will decrease complaints of pain to  2 out of 10 to increase functional hand use for ADL/work/leisure activities. (met)      Plan   Continue 1-2x week x 6-8  weeks during the certification period from 8-30-18  to 10-30-18  in pursuit of OT goals.    Discussed Plan of Care with patient: Yes  Updates/Grading for next session: donna Begum, OT , CHT

## 2018-09-25 ENCOUNTER — CLINICAL SUPPORT (OUTPATIENT)
Dept: REHABILITATION | Facility: HOSPITAL | Age: 23
End: 2018-09-25
Payer: COMMERCIAL

## 2018-09-25 DIAGNOSIS — M25.532 WRIST PAIN, ACUTE, LEFT: Primary | ICD-10-CM

## 2018-09-25 DIAGNOSIS — R29.898 WEAKNESS OF WRIST: ICD-10-CM

## 2018-09-25 PROCEDURE — 97110 THERAPEUTIC EXERCISES: CPT | Mod: PO

## 2018-09-25 PROCEDURE — 97022 WHIRLPOOL THERAPY: CPT | Mod: PO

## 2018-09-25 NOTE — PROGRESS NOTES
Occupational Therapy Daily Treatment Note     Name: Gabriela Cooper  Clinic Number: [id number]    Therapy Diagnosis:   Encounter Diagnosis   Name Primary?    Wrist pain, acute, left Yes     Physician: Marcie Pool PA      Surgical Procedure: Heidi 15, 2018  Visit # / Visits authorized: 7 / 20  Date of Return to MD: 9-20-18      Time In:3  Time Out: 4    Total Billable Time: 60 minutes    Precautions: Standard    Subjective     Pt reports: she is having some aches, but lifting weight is not affecting wrist today.   Response to previous treatment: has some soreness       Pain: 3/10  Location: dorsal wrist and thumb     Objective       Gabriela received the following direct contact modalities after being cleared for contraindications for 8 minutes:  -Patient receives ultrasound  for pain control and remold scar tissue to increase tissue elasticity @ 20 duty cycle, 3.3 Mhz, applied to dorsal left scar/scaphoid region, intensity = 0.6 w/cm2 for 8 minutes        Gabriela participated in dynamic functional therapeutic exercises to improve functional performance for 30  minutes, including:  In fluidotherapy machine x 1 minutes each  -all wrist motions , circumduction both directions with 1#wt   - thumb slides x 2 minutes   - gentle pain-free clothespin pinch ( yellow) x 2 minutes for lateral and 3 jaw      Out of fludiotherpay machine:  - thumb ip flexion with AAROM x 15 reps  - elbow flexion with 1#wt x 15 reps  Triceps with 1#wt x 15 reps  - prayer stretch x 10 reps     Home Exercises and Education Provided       Written Home Exercises Provided:    Exercises were reviewed using water bottle at home and Gabriela was able to demonstrate them prior to the end of the session.  Gabriela demonstrated good  understanding of the education provided.     Pt received a written copy of exercises to perform at home.   See EMR under patient instructions for exercises given.     Gabriela demonstrated  good  understanding of the education provided.       Assessment   Pt using 1#wt today for only 1 minute due to weakness; however, upgrade tolerated well. Pt used 1#wt today for bicep curls and triceps ,  felt no pain with these motions. Will supply gripping exercises next week. Pt would continue to benefit from skilled OT. Pt instructed to slowly wean out of brace for seated school task, watching tv, and gentle fine motor task that does not require pressure of thumb, she agreed to this.      Gabriela is progressing well towards her goals and there are no updates to goals at this time. Pt prognosis is Good.    Pt continues to be limited in functional and leisurely pursuits. Pain limits patients participation in ADL's. Patient continues to requires cues and skilled supervision to complete HEP     Pt will continue to benefit from skilled outpatient occupational therapy to address the deficits listed in the problem list on initial evaluation provide pt/family education and to maximize pt's level of independence in the home and community environment.     Anticipated barriers to occupational therapy: n/a    Pt's spiritual, cultural and educational needs considered and pt agreeable to plan of care and goals.    Goals:     Goals to be met in 6-8  weeks:    1) Patient to be IND with HEP and modalities for pain managment. (met)  2) Patient will  Increase AROM 15-20 degrees in hand/wrist to increase functional hand use for ADLs/work/leisure activities.(progressing)  3) Patient will decrease complaints of pain to  2 out of 10 to increase functional hand use for ADL/work/leisure activities. (met)      Plan   Continue 1-2x week x 6-8  weeks during the certification period from 8-30-18  to 10-30-18  in pursuit of OT goals.    Discussed Plan of Care with patient: Yes  Updates/Grading for next session: donna Begum, OT , CHT

## 2018-09-25 NOTE — PATIENT INSTRUCTIONS
Copyright © [facility]. All rights reserved. Flexion (Resistive)        With hand palm-up and holding __16__ ounces (bottle water), bend hand toward you at wrist. Hold __3__ seconds. Relax slowly.  Repeat __15__ times. Do __2__ sessions per day. EVERY OTHER DAY     Copyright © [facility]. All rights reserved.   Extension (Resistive)        With wrist over edge of table, lift __16__ ounces, keeping arm on table surface. Hold _3___ seconds. Lower slowly.  Repeat __15__ times. Do __2__ sessions per day. EVERY OTHER DAY .EVERY OTHER DAY   Activity: Throw a Frisbee.    Copyright © [facility]. All rights reserved.     Radial Deviation (Resistive)        Holding __16__ ounces, bend wrist upward, with thumb pointing toward you. Hold __3__ seconds.  Hold __3__ seconds. Relax slowly.  Repeat __15__ times. Do __2__ sessions per day. EVERY OTHER DAY   Activity: Use this movement to  a cup.         Copyright © [facility]. All rights reserved.     Ulnar Deviation (Strength)     This exercise is written for your right elbow. Switch sides for your left elbow.  1. Stand up straight. Hold a hand weight in your right hand. Your healthcare provider will tell you what size of hand weight to use.  2. Keep your arm straight down at your side. Bend your wrist backward to lift the weight. Dont move your arm, only your wrist.  3.  Hold __3__ seconds. Relax slowly.   Repeat __15__ times. Do __2__ sessions per day. EVERY OTHER DAY EVERY OTHER DAY         Pronation / Supination (Resistive)        Hold hammer weighing __16__ ounces and rotate palm up and down. Keep elbow flexed at side and wrist straight.  Repeat __15__ times. Do __2__ sessions per day. EVERY OTHER DAY     Copyright © I. All rights reserved.       .[email or web address]

## 2018-09-26 ENCOUNTER — CLINICAL SUPPORT (OUTPATIENT)
Dept: REHABILITATION | Facility: HOSPITAL | Age: 23
End: 2018-09-26
Payer: COMMERCIAL

## 2018-09-26 DIAGNOSIS — M25.532 LEFT WRIST PAIN: ICD-10-CM

## 2018-09-26 PROCEDURE — 97022 WHIRLPOOL THERAPY: CPT | Mod: PO

## 2018-09-26 PROCEDURE — 97110 THERAPEUTIC EXERCISES: CPT | Mod: PO

## 2018-09-26 PROCEDURE — 97035 APP MDLTY 1+ULTRASOUND EA 15: CPT | Mod: PO

## 2018-09-26 NOTE — PROGRESS NOTES
Occupational Therapy Daily Treatment Note     Name: Gabriela Cooper  Clinic Number: [id number]    Therapy Diagnosis:   Encounter Diagnosis   Name Primary?    Left wrist pain      Physician: Marcie Pool PA      Surgical Procedure: Heidi 15, 2018  Visit # / Visits authorized: 8 / 20  Date of Return to MD: 9-20-18      Time In:9  Time Out: 10    Total Billable Time: 60 minutes    Precautions: Standard    Subjective     Pt reports: she is having some aches, but lifting weight is not affecting wrist today.   Response to previous treatment: has some soreness       Pain: 3/10  Location: dorsal wrist and thumb     Objective       Gabriela received the following direct contact modalities after being cleared for contraindications for 8 minutes:  -Patient receives ultrasound  for pain control and remold scar tissue to increase tissue elasticity @ 20 duty cycle, 3.3 Mhz, applied to dorsal left scar/scaphoid region, intensity = 0.6 w/cm2 for 8 minutes        Gabriela participated in dynamic functional therapeutic exercises to improve functional performance for 30  minutes, including:  In fluidotherapy machine x 1 minutes each  -all wrist motions , circumduction both directions with 1#wt (omitted today)  - thumb slides x 2 minutes   - gentle pain-free clothespin pinch ( yellow) x 2 minutes for lateral and 3 jaw      Out of fludiotherpay machine:  - thumb ip flexion with AAROM x 15 reps  - elbow flexion with 1#wt x 15 reps  Triceps with 1#wt x 15 reps  - prayer stretch x 10 reps   -thumb extension x30 reps   - putty presses with yellow for wrist extension x 3 minutes     Home Exercises and Education Provided       Written Home Exercises Provided:    Exercises were reviewed using water bottle at home and Gabriela was able to demonstrate them prior to the end of the session.  Gabriela demonstrated good  understanding of the education provided.     Pt received a written copy of exercises to  perform at home.   See EMR under patient instructions for exercises given.     Gabriela demonstrated good  understanding of the education provided.       Assessment   Pt with better motion noted after yesterdays session. Only sore last night post weights, but no soreness or pain today.  Pt would continue to benefit from skilled OT. Pt instructed to slowly wean out of brace for seated school task, watching tv, and gentle fine motor task that does not require pressure of thumb, she agreed to this.      Gabriela is progressing well towards her goals and there are no updates to goals at this time. Pt prognosis is Good.    Pt continues to be limited in functional and leisurely pursuits. Pain limits patients participation in ADL's. Patient continues to requires cues and skilled supervision to complete HEP     Pt will continue to benefit from skilled outpatient occupational therapy to address the deficits listed in the problem list on initial evaluation provide pt/family education and to maximize pt's level of independence in the home and community environment.     Anticipated barriers to occupational therapy: n/a    Pt's spiritual, cultural and educational needs considered and pt agreeable to plan of care and goals.    Goals:     Goals to be met in 6-8  weeks:    1) Patient to be IND with HEP and modalities for pain managment. (met)  2) Patient will  Increase AROM 15-20 degrees in hand/wrist to increase functional hand use for ADLs/work/leisure activities.(progressing)  3) Patient will decrease complaints of pain to  2 out of 10 to increase functional hand use for ADL/work/leisure activities. (met)      Plan   Continue 1-2x week x 6-8  weeks during the certification period from 8-30-18  to 10-30-18  in pursuit of OT goals.    Discussed Plan of Care with patient: Yes  Updates/Grading for next session: donna Begum, OT , CHT

## 2018-10-02 ENCOUNTER — CLINICAL SUPPORT (OUTPATIENT)
Dept: REHABILITATION | Facility: HOSPITAL | Age: 23
End: 2018-10-02
Payer: COMMERCIAL

## 2018-10-02 DIAGNOSIS — M25.532 LEFT WRIST PAIN: ICD-10-CM

## 2018-10-02 PROCEDURE — 97022 WHIRLPOOL THERAPY: CPT | Mod: PO,59

## 2018-10-02 PROCEDURE — 97110 THERAPEUTIC EXERCISES: CPT | Mod: PO

## 2018-10-02 PROCEDURE — 97018 PARAFFIN BATH THERAPY: CPT | Mod: PO

## 2018-10-02 NOTE — PROGRESS NOTES
Occupational Therapy Daily Treatment Note     Name: Gabriela Cooper  Clinic Number: [id number]    Therapy Diagnosis:   Encounter Diagnosis   Name Primary?    Left wrist pain      Physician: Marcie Pool PA      Surgical Procedure: Heidi 15, 2018  Visit # / Visits authorized: 9 / 20  Date of Return to MD: 9-20-18      Time In:9  Time Out: 10    Total Billable Time: 60 minutes    Precautions: Standard    Subjective     Pt reports: she is having some aches, but lifting weight is not affecting wrist today.   Response to previous treatment: has some soreness       Pain: 3/10  Location: dorsal wrist and thumb     Objective       Gabriela received the following direct contact modalities after being cleared for contraindications for 8 minutes:  -Patient receives ultrasound  for pain control and remold scar tissue to increase tissue elasticity @ 20 duty cycle, 3.3 Mhz, applied to dorsal left scar/scaphoid region, intensity = 0.6 w/cm2 for 8 minutes        Gabriela participated in dynamic functional therapeutic exercises to improve functional performance for 30  minutes, including:  In fluidotherapy machine x 1 minutes each  -all wrist motions , circumduction both directions with 1#wt (omitted today)  - thumb slides x 2 minutes   - gentle pain-free clothespin pinch ( yellow) x 2 minutes for lateral and 3 jaw      Out of fludiotherpay machine:    - elbow flexion with 1#wt x 15 reps  Triceps with 1#wt x 15 reps  - prayer stretch x 10 reps   -thumb extension x30 reps   - putty presses with yellow for wrist extension x 3 minutes     Home Exercises and Education Provided       Written Home Exercises Provided:    Exercises were reviewed using water bottle at home and Gabriela was able to demonstrate them prior to the end of the session.  Gabriela demonstrated good  understanding of the education provided.     Pt received a written copy of exercises to perform at home.   See EMR under patient  instructions for exercises given.     Gabriela demonstrated good  understanding of the education provided.       Assessment   Pt with better motion noted after yesterdays session. Only sore last night post weights, but no soreness or pain today.  Pt would continue to benefit from skilled OT. Pt instructed to slowly wean out of brace for seated school task, watching tv, and gentle fine motor task that does not require pressure of thumb, she agreed to this.      Gabriela is progressing well towards her goals and there are no updates to goals at this time. Pt prognosis is Good.    Pt continues to be limited in functional and leisurely pursuits. Pain limits patients participation in ADL's. Patient continues to requires cues and skilled supervision to complete HEP     Pt will continue to benefit from skilled outpatient occupational therapy to address the deficits listed in the problem list on initial evaluation provide pt/family education and to maximize pt's level of independence in the home and community environment.     Anticipated barriers to occupational therapy: n/a    Pt's spiritual, cultural and educational needs considered and pt agreeable to plan of care and goals.    Goals:     Goals to be met in 6-8  weeks:    1) Patient to be IND with HEP and modalities for pain managment. (met)  2) Patient will  Increase AROM 15-20 degrees in hand/wrist to increase functional hand use for ADLs/work/leisure activities.(progressing)  3) Patient will decrease complaints of pain to  2 out of 10 to increase functional hand use for ADL/work/leisure activities. (met)      Plan   Continue 1-2x week x 6-8  weeks during the certification period from 8-30-18  to 10-30-18  in pursuit of OT goals.    Discussed Plan of Care with patient: Yes  Updates/Grading for next session: donna Begum, OT , CHT

## 2018-10-05 ENCOUNTER — CLINICAL SUPPORT (OUTPATIENT)
Dept: REHABILITATION | Facility: HOSPITAL | Age: 23
End: 2018-10-05
Payer: COMMERCIAL

## 2018-10-05 DIAGNOSIS — M25.532 LEFT WRIST PAIN: ICD-10-CM

## 2018-10-05 PROCEDURE — 97035 APP MDLTY 1+ULTRASOUND EA 15: CPT | Mod: PO

## 2018-10-05 PROCEDURE — 97022 WHIRLPOOL THERAPY: CPT | Mod: PO

## 2018-10-05 PROCEDURE — 97018 PARAFFIN BATH THERAPY: CPT | Mod: PO

## 2018-10-05 PROCEDURE — 97110 THERAPEUTIC EXERCISES: CPT | Mod: PO

## 2018-10-05 NOTE — PROGRESS NOTES
Occupational Therapy Daily Treatment Note     Name: Gabriela Alicea [facility] Number: [id number]    Therapy Diagnosis:   Encounter Diagnosis   Name Primary?    Left wrist pain      Physician: Marcie Pool PA      Surgical Procedure: Heidi 15, 2018  Visit # / Visits authorized: 10 / 20  Date of Return to MD: 9-20-18      Time In:9  Time Out: 10    Total Billable Time: 60 minutes    Precautions: Standard    Subjective     Pt reports: she is having some aches, but lifting weight is not affecting wrist today.   Response to previous treatment: has some soreness       Pain: 3/10  Location: dorsal wrist and thumb     Objective       Gabriela received the following direct contact modalities after being cleared for contraindications for 8 minutes:  -Patient receives ultrasound  for pain control and remold scar tissue to increase tissue elasticity @ 20 duty cycle, 3.3 Mhz, applied to dorsal left scar/scaphoid region, intensity = 0.6 w/cm2 for 8 minutes        Gabriela participated in dynamic functional therapeutic exercises to improve functional performance for 30  minutes, including:  In fluidotherapy machine x 1 minutes each  -all wrist motions , circumduction both directions with 1#wt (omitted today)  - thumb slides x 2 minutes   - gentle pain-free clothespin pinch ( yellow) x 2 minutes for lateral and 3 jaw                              Occupational Therapy Daily Treatment Note     Name: Gabriela Alicea [facility] Number: [id number]    Therapy Diagnosis:   Encounter Diagnosis   Name Primary?    Left wrist pain      Physician: Marcie Pool PA      Surgical Procedure: Heidi 15, 2018  Visit # / Visits authorized: 10 / 20  Date of Return to MD: 9-20-18      Time In:9  Time Out: 10    Total Billable Time: 60 minutes    Precautions: Standard    Subjective     Pt reports: she is1/2wt#   Little pain , only soreness. Started 1/2#wt today , no pain after session, just demo tremoring  Response  to previous treatment: has some soreness       Pain: 2/10  Location: dorsal wrist and thumb     Objective       Gabriela received the following direct contact modalities after being cleared for contraindications for 8 minutes:  -Patient receives ultrasound  for pain control and remold scar tissue to increase tissue elasticity @ 20 duty cycle, 3.3 Mhz, applied to dorsal left scar/scaphoid region, intensity = 0.6 w/cm2 for 8 minutes      - scar extractor used and then Performed scar massage to left dorsal scar  area for 6 minutes with mini vibrator to decrease adhesions and improve tensile glide.     Gabriela participated in dynamic functional therapeutic exercises to improve functional performance for 30  minutes, including:  In fluidotherapy machine x 2 minutes each  -all wrist motions , circumduction both directions (omitted)  - thumb slides x 2 minutes   - gentle pain-free clothespin pinch ( yellow) x 2 minutes for lateral and 3 jaw  - 3 jaw and lateral pinch with 2#(red) clothespin x 2 minutes each   - finger extension with yellow power web x 2 minutes     Out of fludiotherpay machine:  - 1/2#wt x 10 reps for each wrist motion ( and sup/pro)- omitted  - thumb ip flexion with AAROM x 15 reps  - elbow flexion with 1/2#wt x 10 reps  Triceps with 1/3#wt x 10 reps    Applied kineso tape to scar to reduce adhesion on dorsal scar, supplied patient with small strip to apply all weekend     Home Exercises and Education Provided       Written Home Exercises Provided:    Exercises were reviewed and Gabriela was able to demonstrate them prior to the end of the session.  Gabriela demonstrated good  understanding of the education provided.     Pt received a written copy of exercises to perform at home.   See EMR under patient instructions for exercises given.     Gabriela demonstrated good  understanding of the education provided.       Assessment   Pt would continue to benefit from skilled OT. Pt with adhered scar noted with  wrist extension , performed scar massaging for scar tissue mobilization.     Gabriela is progressing well towards her goals and there are no updates to goals at this time. Pt prognosis is Good.    Pt continues to be limited in functional and leisurely pursuits. Pain limits patients participation in ADL's. Patient continues to requires cues and skilled supervision to complete HEP     Pt will continue to benefit from skilled outpatient occupational therapy to address the deficits listed in the problem list on initial evaluation provide pt/family education and to maximize pt's level of independence in the home and community environment.     Anticipated barriers to occupational therapy: n/a    Pt's spiritual, cultural and educational needs considered and pt agreeable to plan of care and goals.    Goals:     Goals to be met in 6-8  weeks:    1) Patient to be IND with HEP and modalities for pain managment. (met)  2) Patient will  Increase AROM 15-20 degrees in hand/wrist to increase functional hand use for ADLs/work/leisure activities.(progressing)  3) Patient will decrease complaints of pain to  2 out of 10 to increase functional hand use for ADL/work/leisure activities. (met)      Plan   Continue 1-2x week x 6-8  weeks during the certification period from 8-30-18  to 10-30-18  in pursuit of OT goals.    Discussed Plan of Care with patient: Yes  Updates/Grading for next session: donna Begum, OT , CHT   Out of fludiotherpay machine:    - elbow flexion with 1#wt x 15 reps  Triceps with 1#wt x 15 reps  - prayer stretch x 10 reps   -thumb extension x30 reps   - putty presses with yellow for wrist extension x 3 minutes     Home Exercises and Education Provided       Written Home Exercises Provided:    Exercises were reviewed using water bottle at home and Gabriela was able to demonstrate them prior to the end of the session.  Gabriela demonstrated good  understanding of the education provided.     Pt received a  written copy of exercises to perform at home.   See EMR under patient instructions for exercises given.     Gabriela demonstrated good  understanding of the education provided.       Assessment   Pt with better motion noted after yesterdays session. Only sore last night post weights, but no soreness or pain today.  Pt would continue to benefit from skilled OT. Pt instructed to slowly wean out of brace for seated school task, watching tv, and gentle fine motor task that does not require pressure of thumb, she agreed to this.      Gabriela is progressing well towards her goals and there are no updates to goals at this time. Pt prognosis is Good.    Pt continues to be limited in functional and leisurely pursuits. Pain limits patients participation in ADL's. Patient continues to requires cues and skilled supervision to complete HEP     Pt will continue to benefit from skilled outpatient occupational therapy to address the deficits listed in the problem list on initial evaluation provide pt/family education and to maximize pt's level of independence in the home and community environment.     Anticipated barriers to occupational therapy: n/a    Pt's spiritual, cultural and educational needs considered and pt agreeable to plan of care and goals.    Goals:     Goals to be met in 6-8  weeks:    1) Patient to be IND with HEP and modalities for pain managment. (met)  2) Patient will  Increase AROM 15-20 degrees in hand/wrist to increase functional hand use for ADLs/work/leisure activities.(progressing)  3) Patient will decrease complaints of pain to  2 out of 10 to increase functional hand use for ADL/work/leisure activities. (met)      Plan   Continue 1-2x week x 6-8  weeks during the certification period from 8-30-18  to 10-30-18  in pursuit of OT goals.    Discussed Plan of Care with patient: Yes  Updates/Grading for next session: donna Begum, OT , CHT

## 2018-10-08 ENCOUNTER — CLINICAL SUPPORT (OUTPATIENT)
Dept: REHABILITATION | Facility: HOSPITAL | Age: 23
End: 2018-10-08
Payer: COMMERCIAL

## 2018-10-08 DIAGNOSIS — M25.532 LEFT WRIST PAIN: ICD-10-CM

## 2018-10-08 PROCEDURE — 97110 THERAPEUTIC EXERCISES: CPT | Mod: PO

## 2018-10-08 PROCEDURE — 97022 WHIRLPOOL THERAPY: CPT | Mod: PO,59

## 2018-10-08 PROCEDURE — 97035 APP MDLTY 1+ULTRASOUND EA 15: CPT | Mod: PO

## 2018-10-08 NOTE — PROGRESS NOTES
Occupational Therapy Daily Treatment Note     Name: Gabriela Cooper  [facility] Number: [id number]    Therapy Diagnosis:   Encounter Diagnosis   Name Primary?    Left wrist pain      Physician: Marcie Pool PA      Surgical Procedure: Heidi 15, 2018  Visit # / Visits authorized: 11 / 20  Date of Return to MD: 9-20-18      Time In:1  Time Out: 2    Total Billable Time: 60 minutes    Precautions: Standard    Subjective     Pt reports: she is1/2wt#   Little pain , only soreness. Started 1/2#wt today , no pain after session, just demo tremoring  Response to previous treatment: has some soreness       Pain: 2/10  Location: dorsal wrist and thumb     Objective       Gabriela received the following direct contact modalities after being cleared for contraindications for 8 minutes:  -Patient receives ultrasound  for pain control and remold scar tissue to increase tissue elasticity @ 20 duty cycle, 3.3 Mhz, applied to dorsal left scar/scaphoid region, intensity = 0.6 w/cm2 for 8 minutes      - scar extractor used and then Performed scar massage to left dorsal scar  area for 6 minutes with mini vibrator to decrease adhesions and improve tensile glide.     Gabriela participated in dynamic functional therapeutic exercises to improve functional performance for 30  minutes, including:  In fluidotherapy machine x 2 minutes each  -all wrist motions , circumduction both directions with 1#wt   - thumb slides  - 3 jaw and lateral pinch with 2#(red) clothespin  - finger extension with yellow power web   - thumb olvera and radial ABD with yellow band #wt x 2 minutes  -Triceps with 2#wt x 10 reps  - elbow flexion with 1#wt x 15 reps  - prayer stretch x 10 reps   -thumb extension x30 reps   - putty presses with yellow for wrist extension x 3 minutes     Home Exercises and Education Provided       Written Home Exercises Provided:    Exercises were reviewed using water bottle at home and Gabriela was  able to demonstrate them prior to the end of the session.  Gabriela demonstrated good  understanding of the education provided.     Pt received a written copy of exercises to perform at home.   See EMR under patient instructions for exercises given.     Gabriela demonstrated good  understanding of the education provided.       Assessment   Pt with better motion noted after yesterdays session. Only sore last night post weights, but no soreness or pain today.  Pt would continue to benefit from skilled OT. Pt instructed to slowly wean out of brace for seated school task, watching tv, and gentle fine motor task that does not require pressure of thumb, she agreed to this.      Gabriela is progressing well towards her goals and there are no updates to goals at this time. Pt prognosis is Good.    Pt continues to be limited in functional and leisurely pursuits. Pain limits patients participation in ADL's. Patient continues to requires cues and skilled supervision to complete HEP     Pt will continue to benefit from skilled outpatient occupational therapy to address the deficits listed in the problem list on initial evaluation provide pt/family education and to maximize pt's level of independence in the home and community environment.     Anticipated barriers to occupational therapy: n/a    Pt's spiritual, cultural and educational needs considered and pt agreeable to plan of care and goals.    Goals:     Goals to be met in 6-8  weeks:    1) Patient to be IND with HEP and modalities for pain managment. (met)  2) Patient will  Increase AROM 15-20 degrees in hand/wrist to increase functional hand use for ADLs/work/leisure activities.(progressing)  3) Patient will decrease complaints of pain to  2 out of 10 to increase functional hand use for ADL/work/leisure activities. (met)      Plan   Continue 1-2x week x 6-8  weeks during the certification period from 8-30-18  to 10-30-18  in pursuit of OT goals.    Discussed Plan of Care  with patient: Yes  Updates/Grading for next session: donna Begum, OT , CHT

## 2018-10-11 ENCOUNTER — CLINICAL SUPPORT (OUTPATIENT)
Dept: REHABILITATION | Facility: HOSPITAL | Age: 23
End: 2018-10-11
Payer: COMMERCIAL

## 2018-10-11 DIAGNOSIS — M25.532 LEFT WRIST PAIN: ICD-10-CM

## 2018-10-11 PROCEDURE — 97022 WHIRLPOOL THERAPY: CPT | Mod: PO

## 2018-10-11 PROCEDURE — 97018 PARAFFIN BATH THERAPY: CPT | Mod: KX,PO

## 2018-10-11 PROCEDURE — 97110 THERAPEUTIC EXERCISES: CPT | Mod: PO

## 2018-10-11 NOTE — PROGRESS NOTES
Occupational Therapy Daily Treatment Note     Name: Gabriela Cooper  Clinic Number: [id number]    Therapy Diagnosis:   Encounter Diagnosis   Name Primary?    Left wrist pain      Physician: Marcie Pool PA      Surgical Procedure: Heidi 15, 2018  Visit # / Visits authorized: 12 / 20  Date of Return to MD: 9-20-18      Time In:2   Time Out: 23  Total Billable Time: 60 minutes    Precautions: Standard    Subjective     Pt reports: she is1/2wt#   Little pain , only soreness. Started 1/2#wt today , no pain after session, just demo tremoring  Response to previous treatment: has some soreness       Pain: 2/10  Location: dorsal wrist and thumb     Objective       Gabriela received the following direct contact modalities after being cleared for contraindications for 8 minutes:  -Patient receives ultrasound  for pain control and remold scar tissue to increase tissue elasticity @ 20 duty cycle, 3.3 Mhz, applied to dorsal left scar/scaphoid region, intensity = 0.6 w/cm2 for 8 minutes      - scar extractor used and then Performed scar massage to left dorsal scar  area for 6 minutes with mini vibrator to decrease adhesions and improve tensile glide.     Gabriela participated in dynamic functional therapeutic exercises to improve functional performance for 30  minutes, including:  In fluidotherapy machine x 2 minutes each  -all wrist motions , circumduction both directions with 1#wt   - thumb slides  - 3 jaw and lateral pinch with 2#(red) clothespin  - finger extension with yellow power web   - thumb olvera and radial ABD with yellow band #wt x 2 minutes  -Triceps with 2#wt x 10 reps  - elbow flexion with 1#wt x 15 reps  - prayer stretch x 10 reps   -thumb extension x30 reps   - putty presses with yellow for wrist extension x 3 minutes     Home Exercises and Education Provided       Written Home Exercises Provided:  Supplied putty for ONLY gripping x 3 minutes every other day. No thumb  use at this time. Exercises were reviewed using water bottle at home and Gabriela was able to demonstrate them prior to the end of the session.  Gabriela demonstrated good  understanding of the education provided.     Pt received a written copy of exercises to perform at home.   See EMR under patient instructions for exercises given.     Gabriela demonstrated good  understanding of the education provided.       Assessment   Pt with better motion noted after yesterdays session. Only sore last night post weights, but no soreness or pain today.  Pt would continue to benefit from skilled OT. Pt instructed to slowly wean out of brace for seated school task, watching tv, and gentle fine motor task that does not require pressure of thumb, she agreed to this.      Gabriela is progressing well towards her goals and there are no updates to goals at this time. Pt prognosis is Good.    Pt continues to be limited in functional and leisurely pursuits. Pain limits patients participation in ADL's. Patient continues to requires cues and skilled supervision to complete HEP     Pt will continue to benefit from skilled outpatient occupational therapy to address the deficits listed in the problem list on initial evaluation provide pt/family education and to maximize pt's level of independence in the home and community environment.     Anticipated barriers to occupational therapy: n/a    Pt's spiritual, cultural and educational needs considered and pt agreeable to plan of care and goals.    Goals:     Goals to be met in 6-8  weeks:    1) Patient to be IND with HEP and modalities for pain managment. (met)  2) Patient will  Increase AROM 15-20 degrees in hand/wrist to increase functional hand use for ADLs/work/leisure activities.(progressing)  3) Patient will decrease complaints of pain to  2 out of 10 to increase functional hand use for ADL/work/leisure activities. (met)      Plan   Continue 1-2x week x 6-8  weeks during the  certification period from 8-30-18  to 10-30-18  in pursuit of OT goals.    Discussed Plan of Care with patient: Yes  Updates/Grading for next session: donna Begum, OT , CHT

## 2018-10-16 ENCOUNTER — CLINICAL SUPPORT (OUTPATIENT)
Dept: REHABILITATION | Facility: HOSPITAL | Age: 23
End: 2018-10-16
Payer: COMMERCIAL

## 2018-10-16 DIAGNOSIS — M25.532 LEFT WRIST PAIN: ICD-10-CM

## 2018-10-16 PROCEDURE — 97018 PARAFFIN BATH THERAPY: CPT | Mod: PO

## 2018-10-16 PROCEDURE — 97110 THERAPEUTIC EXERCISES: CPT | Mod: PO

## 2018-10-16 PROCEDURE — 97022 WHIRLPOOL THERAPY: CPT | Mod: PO

## 2018-10-16 NOTE — PROGRESS NOTES
Occupational Therapy Daily Treatment Note     Name: Gabriela Cooper  Clinic Number: [id number]    Therapy Diagnosis:   Encounter Diagnosis   Name Primary?    Left wrist pain      Physician: Marcie Pool PA      Surgical Procedure: Heidi 15, 2018  Visit # / Visits authorized: 13 / 20  Date of Return to MD: 9-20-18      Time In:10  Time Out: 11  Total Billable Time: 60 minutes    Precautions: Standard    Subjective     Pt reports: she reports falling this weekend with brace donned and in sand.  Response to previous treatment: has some soreness       Pain: 2/10  Location: dorsal wrist and thumb     Objective       Gabriela received the following direct contact modalities after being cleared for contraindications for 8 minutes:  -Patient receives ultrasound  for pain control and remold scar tissue to increase tissue elasticity @ 20 duty cycle, 3.3 Mhz, applied to dorsal left scar/scaphoid region, intensity = 0.6 w/cm2 for 8 minutes      - scar extractor used and then Performed scar massage to left dorsal scar  area for 6 minutes with mini vibrator to decrease adhesions and improve tensile glide.     Gabriela participated in dynamic functional therapeutic exercises to improve functional performance for 30  minutes, including:  In fluidotherapy machine x 2 minutes each  -all wrist motions , circumduction both directions with 1#wt   - thumb slides  - 3 jaw and lateral pinch with 2#(red) clothespin  - finger extension with yellow power web   - thumb olvera and radial ABD with yellow band #wt x 2 minutes  -Triceps with 2#wt x 10 reps  - elbow flexion with 1#wt x 15 reps  - prayer stretch x 10 reps   -thumb extension x30 reps   - putty presses with yellow for wrist extension x 3 minutes     Range of Motion:   Left Active   10/16/2018   INDEX                          MP Ext/Flex Wnl/91 (+31)                         PIP Ext/Flex Wnl/114(+23)                         DIP Ext/Flex Wnl/75  (WNL)   THUMB                          MP Ext/Flex Wnl/54(+54)                         IP Ext/Flex Wnl/76(+41)                         Bertrand ABD 55(+15)                         Radial ABD 45 (+15)                         Opposition wnl     Comments:      Wrist ROM: Left  Active  fisted 10/16/2018   Extension 45(+15)   Flexion 35(+50)   Radial Deviation wnl   Ulnar Deviation 29(+11)   Supination 83(+38)   Pronation wnl     Comments: meauremens may be skewed  due to a fall 3 days ago  Comments: wrist flexion was -15  On evaluation date, patient can now flatten hand to properly measure radial ABD.      Home Exercises and Education Provided       Written Home Exercises Provided:  Supplied putty for ONLY gripping x 3 minutes every other day. No thumb use at this time. Exercises were reviewed using water bottle at home and Gabriela was able to demonstrate them prior to the end of the session.  Gabriela demonstrated good  understanding of the education provided.     Pt received a written copy of exercises to perform at home.   See EMR under patient instructions for exercises given.     Gabriela demonstrated good  understanding of the education provided.       Assessment   Pt fell Saturday, no weight done today, only pain-free motions ( fingers only) and reassessed today. Pt fell in sand with wrist extended.   Pt would continue to benefit from skilled OT. Pt instructed to slowly wean out of brace for seated school task, watching tv, and gentle fine motor task that does not require pressure of thumb, she agreed to this.      Gabriela is progressing well towards her goals and there are no updates to goals at this time. Pt prognosis is Good.    Pt continues to be limited in functional and leisurely pursuits. Pain limits patients participation in ADL's. Patient continues to requires cues and skilled supervision to complete HEP     Pt will continue to benefit from skilled outpatient occupational therapy to address the deficits  listed in the problem list on initial evaluation provide pt/family education and to maximize pt's level of independence in the home and community environment.     Anticipated barriers to occupational therapy: n/a    Pt's spiritual, cultural and educational needs considered and pt agreeable to plan of care and goals.    Goals:     Goals to be met in 6-8  weeks:    1) Patient to be IND with HEP and modalities for pain managment. (met)  2) Patient will  Increase AROM 15-20 degrees in hand/wrist to increase functional hand use for ADLs/work/leisure activities.(met)  3) Patient will decrease complaints of pain to  2 out of 10 to increase functional hand use for ADL/work/leisure activities. (met)     Goals to be met in 6-8  weeks:    1) Patient to be IND with HEP and modalities for pain managment.  2) Patient will  Increase AROM 15-20 degrees in hand/wrist to increase functional hand use for ADLs/work/leisure activities.  3)Pt will increase  strength 10-20 lbs. to improve functional grasp for ADLs/work/leisure activities.   4) Pt will increase pinch strength in all 3 limited positions by 1-3 psi to assist with manipulation and fine motor task    Plan   Continue 1-2x week x 6-8  weeks during the certification period from  10-16-18 to 12-16-18   in pursuit of OT goals.    Discussed Plan of Care with patient: Yes  Updates/Grading for next session: donna Begum, OT , CHT

## 2018-10-17 ENCOUNTER — HOSPITAL ENCOUNTER (OUTPATIENT)
Dept: RADIOLOGY | Facility: OTHER | Age: 23
Discharge: HOME | End: 2018-10-17
Attending: PHYSICIAN ASSISTANT
Payer: COMMERCIAL

## 2018-10-17 ENCOUNTER — OFFICE VISIT (OUTPATIENT)
Dept: ORTHOPEDICS | Facility: CLINIC | Age: 23
End: 2018-10-17
Payer: COMMERCIAL

## 2018-10-17 VITALS
SYSTOLIC BLOOD PRESSURE: 108 MMHG | BODY MASS INDEX: 21.6 KG/M2 | DIASTOLIC BLOOD PRESSURE: 72 MMHG | HEART RATE: 73 BPM | WEIGHT: 110 LBS | HEIGHT: 60 IN

## 2018-10-17 DIAGNOSIS — S62.022A CLOSED DISPLACED FRACTURE OF MIDDLE THIRD OF SCAPHOID BONE OF LEFT WRIST, INITIAL ENCOUNTER: ICD-10-CM

## 2018-10-17 DIAGNOSIS — S62.022A CLOSED DISPLACED FRACTURE OF MIDDLE THIRD OF SCAPHOID BONE OF LEFT WRIST, INITIAL ENCOUNTER: Primary | ICD-10-CM

## 2018-10-17 DIAGNOSIS — Z47.89 ORTHOPEDIC AFTERCARE: ICD-10-CM

## 2018-10-17 PROCEDURE — 73110 X-RAY EXAM OF WRIST: CPT | Mod: TC,FY,LT

## 2018-10-17 PROCEDURE — 73110 X-RAY EXAM OF WRIST: CPT | Mod: 26,LT,, | Performed by: RADIOLOGY

## 2018-10-17 PROCEDURE — 99213 OFFICE O/P EST LOW 20 MIN: CPT | Mod: S$GLB,,, | Performed by: PHYSICIAN ASSISTANT

## 2018-10-17 PROCEDURE — 3008F BODY MASS INDEX DOCD: CPT | Mod: CPTII,S$GLB,, | Performed by: PHYSICIAN ASSISTANT

## 2018-10-17 PROCEDURE — 99999 PR PBB SHADOW E&M-EST. PATIENT-LVL III: CPT | Mod: PBBFAC,,, | Performed by: PHYSICIAN ASSISTANT

## 2018-10-17 NOTE — PROGRESS NOTES
Ms. Cooper is here today for a post-operative visit.  She is 4 months status post Open reduction and internal fixation of left scaphoid wrist fracture by Dr. Bridges on 6/15/18. She has been attending OT and performing her home exercises.  She did have a fall 4 days ago, reports that she had her brace on but fell in the sand landing on the left wrist. She did have increased pain and stiffness in the left wrist after this.  She has been using a bone stimulator daily.   She denies fever, chills, and sweats since the time of the surgery.     Physical exam:    Vitals:    10/17/18 0858   BP: 108/72   Pulse: 73   Weight: 49.9 kg (110 lb)   Height: 5' (1.524 m)   PainSc:   4     Vital signs are stable, patient is afebrile.  Patient is well dressed and well groomed, no acute distress.  Alert and oriented to person, place, and time.  Incision is healing well - clean, dry and intact.  Non-tender at the incision. Non-tender at the anatomical snuffbox.  There is no erythema or exudate.  There is no sign of any infection. She is NVI.  Good thumb ROM; fair wrist ROM, improving. Good finger range of motion.      RADIOLOGY:  Left Wrist X-Ray, 10/17/18  FINDINGS:  Internal fixation screw noted through the scaphoid.  The alignment is unchanged.  No new fracture.  No marrow replacement process.      Impression     No detrimental change.       Assessment: 4 months status post Open reduction and internal fixation of left scaphoid wrist fracture    Plan:  Gabriela was seen today for post-op evaluation.    Diagnoses and all orders for this visit:    Closed displaced fracture of middle third of scaphoid bone of left wrist, initial encounter    Orthopedic aftercare          - continue use of bone stimulator until sessions   - Reviewed X-Ray with patient  - Wean out of thumb spica brace  - Continue OT and HEP  - Gradual strengthening  - Follow up as needed  - Discussed patient's frequent falls  - Call with questions or concerns

## 2018-10-18 ENCOUNTER — CLINICAL SUPPORT (OUTPATIENT)
Dept: REHABILITATION | Facility: HOSPITAL | Age: 23
End: 2018-10-18
Payer: COMMERCIAL

## 2018-10-18 DIAGNOSIS — M25.532 LEFT WRIST PAIN: ICD-10-CM

## 2018-10-18 PROCEDURE — 97110 THERAPEUTIC EXERCISES: CPT | Mod: PO

## 2018-10-18 PROCEDURE — 97022 WHIRLPOOL THERAPY: CPT | Mod: PO,59

## 2018-10-18 PROCEDURE — 97035 APP MDLTY 1+ULTRASOUND EA 15: CPT | Mod: PO

## 2018-10-18 NOTE — PROGRESS NOTES
"                                Occupational Therapy Daily Treatment Note     Name: Gabriela Cooper  Clinic Number: [id number]    Therapy Diagnosis:   Encounter Diagnosis   Name Primary?    Left wrist pain      Physician: Marcie Pool PA      Surgical Procedure: Heidi 15, 2018  Visit # / Visits authorized: 14 / 20  Date of Return to MD: 9-20-18      Time In:10  Time Out: 11  Total Billable Time: 60 minutes    Precautions: Standard    Subjective     Pt reports: " It is feeling much better today."  Response to previous treatment: has some soreness       Pain: 2/10  Location: dorsal wrist and thumb     Objective       Gabriela received the following direct contact modalities after being cleared for contraindications for 8 minutes:  -Patient receives ultrasound  for pain control and remold scar tissue to increase tissue elasticity @ 20 duty cycle, 3.3 Mhz, applied to dorsal left scar/scaphoid region, intensity = 0.6 w/cm2 for 8 minutes      - scar extractor used and then Performed scar massage to left dorsal scar  area for 6 minutes with mini vibrator to decrease adhesions and improve tensile glide.     Gabriela participated in dynamic functional therapeutic exercises to improve functional performance for 30  minutes, including:  In fluidotherapy machine x 2 minutes each  -all wrist motions , circumduction both directions with 1#wt   - thumb slides  - 3 jaw and lateral pinch with 2#(red) clothespin  - finger extension with yellow power web   - thumb olvera and radial ABD with yellow band #wt x 2 minutes  -Triceps with 2#wt x 10 reps  - elbow flexion with 1#wt x 15 reps  - prayer stretch x 10 reps   -thumb extension x30 reps   - putty presses with yellow for wrist extension x 3 minutes         Home Exercises and Education Provided       Written Home Exercises Provided:  Supplied putty for ONLY gripping x 3 minutes every other day. No thumb use at this time. Exercises were reviewed using water bottle at home and " Gabriela was able to demonstrate them prior to the end of the session.  Gabriela demonstrated good  understanding of the education provided.     Pt received a written copy of exercises to perform at home.   See EMR under patient instructions for exercises given.     Gabriela demonstrated good  understanding of the education provided.       Assessment   No currect breaks and nicely healed ORIF from yesterdays sessions. Will begin 2-3#wt next week    Gabriela is progressing well towards her goals and there are no updates to goals at this time. Pt prognosis is Good.    Pt continues to be limited in functional and leisurely pursuits. Pain limits patients participation in ADL's. Patient continues to requires cues and skilled supervision to complete HEP     Pt will continue to benefit from skilled outpatient occupational therapy to address the deficits listed in the problem list on initial evaluation provide pt/family education and to maximize pt's level of independence in the home and community environment.     Anticipated barriers to occupational therapy: n/a    Pt's spiritual, cultural and educational needs considered and pt agreeable to plan of care and goals.    Goals:     Goals to be met in 6-8  weeks:    1) Patient to be IND with HEP and modalities for pain managment. (met)  2) Patient will  Increase AROM 15-20 degrees in hand/wrist to increase functional hand use for ADLs/work/leisure activities.(met)  3) Patient will decrease complaints of pain to  2 out of 10 to increase functional hand use for ADL/work/leisure activities. (met)     Goals to be met in 6-8  weeks:    1) Patient to be IND with HEP and modalities for pain managment.  2) Patient will  Increase AROM 15-20 degrees in hand/wrist to increase functional hand use for ADLs/work/leisure activities.  3)Pt will increase  strength 10-20 lbs. to improve functional grasp for ADLs/work/leisure activities.   4) Pt will increase pinch strength in all 3 limited  positions by 1-3 psi to assist with manipulation and fine motor task    Plan   Continue 1-2x week x 6-8  weeks during the certification period from  10-16-18 to 12-16-18   in pursuit of OT goals.    Discussed Plan of Care with patient: Yes  Updates/Grading for next session: donna Begum, OT , CHT

## 2018-10-23 ENCOUNTER — CLINICAL SUPPORT (OUTPATIENT)
Dept: REHABILITATION | Facility: HOSPITAL | Age: 23
End: 2018-10-23
Payer: COMMERCIAL

## 2018-10-23 DIAGNOSIS — M25.532 LEFT WRIST PAIN: ICD-10-CM

## 2018-10-23 PROCEDURE — 97110 THERAPEUTIC EXERCISES: CPT | Mod: PO

## 2018-10-23 PROCEDURE — 97022 WHIRLPOOL THERAPY: CPT | Mod: PO

## 2018-10-23 NOTE — PROGRESS NOTES
"                            Occupational Therapy Daily Treatment Note     Name: Gabriela Cooper  Clinic Number: [id number]    Therapy Diagnosis:   Encounter Diagnosis   Name Primary?    Left wrist pain      Physician: Marcie Pool PA      Surgical Procedure: Heidi 15, 2018  Visit # / Visits authorized: 15 / 20  Date of Return to MD: 9-20-18      Time In:11  Time Out: 12  Total Billable Time: 60 minutes    Precautions: Standard    Subjective     Pt reports: " I think this weather is causing this pain."  Response to previous treatment: has some soreness       Pain: 2/10  Location: dorsal wrist and thumb     Objective       Gabriela received the following direct contact modalities after being cleared for contraindications for 8 minutes:  -Patient receives ultrasound  for pain control and remold scar tissue to increase tissue elasticity @ 20 duty cycle, 3.3 Mhz, applied to dorsal left scar/scaphoid region, intensity = 0.6 w/cm2 for 8 minutes      - scar extractor used and then Performed scar massage to left dorsal scar  area for 6 minutes with mini vibrator to decrease adhesions and improve tensile glide.     Gabriela participated in dynamic functional therapeutic exercises to improve functional performance for 30  minutes, including:  In fluidotherapy machine x 2 minutes each  -all wrist motions , circumduction both directions with 1#wt   - thumb slides  - 3 jaw and lateral pinch with 2#(red) clothespin  - finger extension with yellow power web   - thumb olvera and radial ABD with yellow band #wt x 2 minutes  -Triceps with 2#wt x 10 reps  - elbow flexion with 1#wt x 15 reps  - prayer stretch x 10 reps   -thumb extension x30 reps   - putty presses with yellow for wrist extension x 3 minutes         Home Exercises and Education Provided       Written Home Exercises Provided:  Supplied putty for ONLY gripping x 3 minutes every other day. No thumb use at this time. Exercises were reviewed using water bottle at " home and Gabriela was able to demonstrate them prior to the end of the session.  Gabriela demonstrated good  understanding of the education provided.     Pt received a written copy of exercises to perform at home.   See EMR under patient instructions for exercises given.     Gabriela demonstrated good  understanding of the education provided.       Assessment   Pt will reduce brace wear time and wean out within 5 days.     Gabriela is progressing well towards her goals and there are no updates to goals at this time. Pt prognosis is Good.    Pt continues to be limited in functional and leisurely pursuits. Pain limits patients participation in ADL's. Patient continues to requires cues and skilled supervision to complete HEP     Pt will continue to benefit from skilled outpatient occupational therapy to address the deficits listed in the problem list on initial evaluation provide pt/family education and to maximize pt's level of independence in the home and community environment.     Anticipated barriers to occupational therapy: n/a    Pt's spiritual, cultural and educational needs considered and pt agreeable to plan of care and goals.    Goals:     Goals to be met in 6-8  weeks:    1) Patient to be IND with HEP and modalities for pain managment. (met)  2) Patient will  Increase AROM 15-20 degrees in hand/wrist to increase functional hand use for ADLs/work/leisure activities.(met)  3) Patient will decrease complaints of pain to  2 out of 10 to increase functional hand use for ADL/work/leisure activities. (met)     Goals to be met in 6-8  weeks:    1) Patient to be IND with HEP and modalities for pain managment.  2) Patient will  Increase AROM 15-20 degrees in hand/wrist to increase functional hand use for ADLs/work/leisure activities.  3)Pt will increase  strength 10-20 lbs. to improve functional grasp for ADLs/work/leisure activities.   4) Pt will increase pinch strength in all 3 limited positions by 1-3 psi to  assist with manipulation and fine motor task    Plan   Continue 1-2x week x 6-8  weeks during the certification period from  10-16-18 to 12-16-18   in pursuit of OT goals.    Discussed Plan of Care with patient: Yes  Updates/Grading for next session: donna Begum, OT , CHT

## 2018-10-25 ENCOUNTER — CLINICAL SUPPORT (OUTPATIENT)
Dept: REHABILITATION | Facility: HOSPITAL | Age: 23
End: 2018-10-25
Payer: COMMERCIAL

## 2018-10-25 DIAGNOSIS — M25.532 LEFT WRIST PAIN: ICD-10-CM

## 2018-10-25 PROCEDURE — 97022 WHIRLPOOL THERAPY: CPT | Mod: PO

## 2018-10-25 PROCEDURE — 97110 THERAPEUTIC EXERCISES: CPT | Mod: PO

## 2018-10-25 NOTE — PROGRESS NOTES
"                            Occupational Therapy Daily Treatment Note     Name: Gabriela Cooper  Clinic Number: [id number]    Therapy Diagnosis:   Encounter Diagnosis   Name Primary?    Left wrist pain      Physician: Marcie Pool PA      Surgical Procedure: Heidi 15, 2018  Visit # / Visits authorized: 16 / 20  Date of Return to MD: 9-20-18      Time In:1  Time Out: 2  Total Billable Time: 60 minutes    Precautions: Standard    Subjective     Pt reports: " I think this weather is causing this pain."  Response to previous treatment: has some soreness       Pain: 2/10  Location: dorsal wrist and thumb     Objective       Gabriela received the following direct contact modalities after being cleared for contraindications for 8 minutes:  -Patient receives ultrasound  for pain control and remold scar tissue to increase tissue elasticity @ 20 duty cycle, 3.3 Mhz, applied to dorsal left scar/scaphoid region, intensity = 0.6 w/cm2 for 8 minutes      - scar extractor used and then Performed scar massage to left dorsal scar  area for 6 minutes with mini vibrator to decrease adhesions and improve tensile glide.     Gabriela participated in dynamic functional therapeutic exercises to improve functional performance for 30  minutes, including:  In fluidotherapy machine x 2 minutes each  -all wrist motions , circumduction both directions with 1#wt   - thumb slides  - 3 jaw and lateral pinch with 2#(red) clothespin  - finger extension with yellow power web   - thumb olvera and radial ABD with yellow band x 2 minutes  -Triceps with 2#wt x 10 reps  -gripping with yellow putty for  strength   - elbow flexion with 1#wt x 15 reps   -thumb extension x30 reps   - putty presses with yellow for wrist extension x 3 minutes         Home Exercises and Education Provided       Written Home Exercises Provided:  Supplied putty for ONLY gripping x 3 minutes every other day. No thumb use at this time. Exercises were reviewed using " water bottle at home and Gabriela was able to demonstrate them prior to the end of the session.  Gabriela demonstrated good  understanding of the education provided.     Pt received a written copy of exercises to perform at home.   See EMR under patient instructions for exercises given.     Gabriela demonstrated good  understanding of the education provided.       Assessment   Pt was able to paint her nails today. This is a first post surgery. Pain is less and not wearing brace today.    Gabriela is progressing well towards her goals and there are no updates to goals at this time. Pt prognosis is Good.    Pt continues to be limited in functional and leisurely pursuits. Pain limits patients participation in ADL's. Patient continues to requires cues and skilled supervision to complete HEP     Pt will continue to benefit from skilled outpatient occupational therapy to address the deficits listed in the problem list on initial evaluation provide pt/family education and to maximize pt's level of independence in the home and community environment.     Anticipated barriers to occupational therapy: n/a    Pt's spiritual, cultural and educational needs considered and pt agreeable to plan of care and goals.    Goals:     Goals to be met in 6-8  weeks:    1) Patient to be IND with HEP and modalities for pain managment. (met)  2) Patient will  Increase AROM 15-20 degrees in hand/wrist to increase functional hand use for ADLs/work/leisure activities.(met)  3) Patient will decrease complaints of pain to  2 out of 10 to increase functional hand use for ADL/work/leisure activities. (met)     Goals to be met in 6-8  weeks:    1) Patient to be IND with HEP and modalities for pain managment.  2) Patient will  Increase AROM 15-20 degrees in hand/wrist to increase functional hand use for ADLs/work/leisure activities.  3)Pt will increase  strength 10-20 lbs. to improve functional grasp for ADLs/work/leisure activities.   4) Pt will  increase pinch strength in all 3 limited positions by 1-3 psi to assist with manipulation and fine motor task    Plan   Continue 1-2x week x 6-8  weeks during the certification period from  10-16-18 to 12-16-18   in pursuit of OT goals.    Discussed Plan of Care with patient: Yes  Updates/Grading for next session: donna Begum, OT , CHT

## 2018-10-30 ENCOUNTER — CLINICAL SUPPORT (OUTPATIENT)
Dept: REHABILITATION | Facility: HOSPITAL | Age: 23
End: 2018-10-30
Payer: COMMERCIAL

## 2018-10-30 DIAGNOSIS — M25.532 LEFT WRIST PAIN: ICD-10-CM

## 2018-10-30 PROCEDURE — 97022 WHIRLPOOL THERAPY: CPT | Mod: PO,59

## 2018-10-30 PROCEDURE — 97018 PARAFFIN BATH THERAPY: CPT | Mod: PO

## 2018-10-30 PROCEDURE — 97110 THERAPEUTIC EXERCISES: CPT | Mod: PO

## 2018-10-30 NOTE — PROGRESS NOTES
"                            Occupational Therapy Daily Treatment Note     Name: Gabriela Cooper  Clinic Number: [id number]    Therapy Diagnosis:   Encounter Diagnosis   Name Primary?    Left wrist pain      Physician: Marcie Pool PA      Surgical Procedure: Heidi 15, 2018  Visit # / Visits authorized: 17 / 20  Date of Return to MD: 9-20-18      Time In:10  Time Out: 11  Total Billable Time: 60 minutes    Precautions: Standard    Subjective     Pt reports: " I feel much better now. I can lift the weights agin."  Response to previous treatment: has some soreness       Pain: 2/10  Location: dorsal wrist and thumb     Objective       Gabriela received the following direct contact modalities after being cleared for contraindications for 8 minutes:  -Patient receives ultrasound  for pain control and remold scar tissue to increase tissue elasticity @ 20 duty cycle, 3.3 Mhz, applied to dorsal left scar/scaphoid region, intensity = 0.6 w/cm2 for 8 minutes      - scar extractor used and then Performed scar massage to left dorsal scar  area for 6 minutes with mini vibrator to decrease adhesions and improve tensile glide.     Gabriela participated in dynamic functional therapeutic exercises to improve functional performance for 30  minutes, including:  In fluidotherapy machine x 2 minutes each  -all wrist motions , circumduction both directions with 1#wt   - thumb slides  - 3 jaw and lateral pinch with 2#(red) clothespin  - finger extension with yellow power web   - thumb olvera and radial ABD with yellow band x 2 minutes  -Triceps with 2#wt x 10 reps  -gripping with yellow putty for  strength   - elbow flexion with 1#wt x 15 reps   -thumb extension x30 reps   - putty presses with yellow for wrist extension x 3 minutes         Home Exercises and Education Provided       Written Home Exercises Provided:  Supplied putty for ONLY gripping x 3 minutes every other day. No thumb use at this time. Exercises were " reviewed using water bottle at home and Gabriela was able to demonstrate them prior to the end of the session.  Gabriela demonstrated good  understanding of the education provided.     Pt received a written copy of exercises to perform at home.   See EMR under patient instructions for exercises given.     Gabriela demonstrated good  understanding of the education provided.       Assessment   Pt toelrated upbgrade weights to 2 pounds again. No pain post session    Gabriela is progressing well towards her goals and there are no updates to goals at this time. Pt prognosis is Good.    Pt continues to be limited in functional and leisurely pursuits. Pain limits patients participation in ADL's. Patient continues to requires cues and skilled supervision to complete HEP     Pt will continue to benefit from skilled outpatient occupational therapy to address the deficits listed in the problem list on initial evaluation provide pt/family education and to maximize pt's level of independence in the home and community environment.     Anticipated barriers to occupational therapy: n/a    Pt's spiritual, cultural and educational needs considered and pt agreeable to plan of care and goals.    Goals:     Goals to be met in 6-8  weeks:    1) Patient to be IND with HEP and modalities for pain managment. (met)  2) Patient will  Increase AROM 15-20 degrees in hand/wrist to increase functional hand use for ADLs/work/leisure activities.(met)  3) Patient will decrease complaints of pain to  2 out of 10 to increase functional hand use for ADL/work/leisure activities. (met)     Goals to be met in 6-8  weeks:    1) Patient to be IND with HEP and modalities for pain managment.  2) Patient will  Increase AROM 15-20 degrees in hand/wrist to increase functional hand use for ADLs/work/leisure activities.  3)Pt will increase  strength 10-20 lbs. to improve functional grasp for ADLs/work/leisure activities.   4) Pt will increase pinch strength  in all 3 limited positions by 1-3 psi to assist with manipulation and fine motor task    Plan   Continue 1-2x week x 6-8  weeks during the certification period from  10-16-18 to 12-16-18   in pursuit of OT goals.    Discussed Plan of Care with patient: Yes  Updates/Grading for next session: donna Begum, OT , CHT

## 2018-11-06 ENCOUNTER — CLINICAL SUPPORT (OUTPATIENT)
Dept: REHABILITATION | Facility: HOSPITAL | Age: 23
End: 2018-11-06
Payer: COMMERCIAL

## 2018-11-06 DIAGNOSIS — M25.532 LEFT WRIST PAIN: ICD-10-CM

## 2018-11-06 PROCEDURE — 97022 WHIRLPOOL THERAPY: CPT | Mod: PO

## 2018-11-06 PROCEDURE — 97110 THERAPEUTIC EXERCISES: CPT | Mod: PO

## 2018-11-06 NOTE — PROGRESS NOTES
"                            Occupational Therapy Daily Treatment Note     Name: Gabriela Cooper  [facility] Number: [id number]    Therapy Diagnosis:   Encounter Diagnosis   Name Primary?    Left wrist pain      Physician: Marcie Pool PA      Surgical Procedure: Heidi 15, 2018  Visit # / Visits authorized: 18 / 20  Date of Return to MD: 9-20-18      Time In:9  Time Out: 10  Total Billable Time: 60 minutes    Precautions: Standard    Subjective     Pt reports: " It feels much better."   Response to previous treatment: pain-free      Pain: 2/10  Location: dorsal wrist and thumb     Objective           Gabriela participated in dynamic functional therapeutic exercises to improve functional performance for 30  minutes, including:  In fluidotherapy machine x 2 minutes each  -all wrist motions , circumduction both directions with 2#wt   - thumb slides  - 3 jaw and lateral pinch with 2#(red) clothespin  - finger extension with yellow power web   - thumb olvera and radial ABD with yellow band x 2 minutes  -Triceps with 2#wt   -gripping with yellow putty for  strength   - elbow flexion with 2#wt   -thumb opposition with purple thumb helper (yellow band)  - putty presses with yellow for wrist extension x 3 minutes      - scar extractor used and then Performed scar massage to left dorsal scar  area for 6 minutes with mini vibrator to decrease adhesions and improve tensile glide.       Home Exercises and Education Provided       Written Home Exercises Provided:  Supplied putty for ONLY gripping x 3 minutes every other day. No thumb use at this time. Exercises were reviewed using water bottle at home and Gabriela was able to demonstrate them prior to the end of the session.  Gabriela demonstrated good  understanding of the education provided.     Pt received a written copy of exercises to perform at home.   See EMR under patient instructions for exercises given.     Gabriela demonstrated good  understanding of the " education provided.       Assessment   Pt has weaned off brace and only to wear when she feels weakness in hand/wrist. She is lifting 2#wt at this time.  Gabriela is progressing well towards her goals and there are no updates to goals at this time. Pt prognosis is Good.    Pt continues to be limited in functional and leisurely pursuits. Pain limits patients participation in ADL's. Patient continues to requires cues and skilled supervision to complete HEP     Pt will continue to benefit from skilled outpatient occupational therapy to address the deficits listed in the problem list on initial evaluation provide pt/family education and to maximize pt's level of independence in the home and community environment.     Anticipated barriers to occupational therapy: n/a    Pt's spiritual, cultural and educational needs considered and pt agreeable to plan of care and goals.    Goals:      Goals to be met in 6-8  weeks:    1) Patient to be IND with HEP and modalities for pain managment.  2) Patient will  Increase AROM 15-20 degrees in hand/wrist to increase functional hand use for ADLs/work/leisure activities.  3)Pt will increase  strength 10-20 lbs. to improve functional grasp for ADLs/work/leisure activities.   4) Pt will increase pinch strength in all 3 limited positions by 1-3 psi to assist with manipulation and fine motor task    Plan   Continue 1-2x week x 6-8  weeks during the certification period from  10-16-18 to 12-16-18   in pursuit of OT goals.    Discussed Plan of Care with patient: Yes  Updates/Grading for next session: donna Begum, OT , CHT

## 2018-11-08 ENCOUNTER — CLINICAL SUPPORT (OUTPATIENT)
Dept: REHABILITATION | Facility: HOSPITAL | Age: 23
End: 2018-11-08
Payer: COMMERCIAL

## 2018-11-08 DIAGNOSIS — M25.532 LEFT WRIST PAIN: ICD-10-CM

## 2018-11-08 PROCEDURE — 97018 PARAFFIN BATH THERAPY: CPT | Mod: PO

## 2018-11-08 PROCEDURE — 97110 THERAPEUTIC EXERCISES: CPT | Mod: PO

## 2018-11-08 NOTE — PROGRESS NOTES
"                            Occupational Therapy Daily Treatment Note     Name: Gabriela Cooper  [facility] Number: [id number]    Therapy Diagnosis:   Encounter Diagnosis   Name Primary?    Left wrist pain      Physician: Marcie Pool PA      Surgical Procedure: Heidi 15, 2018  Visit # / Visits authorized: 19 / 20  Date of Return to MD: 9-20-18      Time In:9  Time Out: 10  Total Billable Time: 60 minutes    Precautions: Standard    Subjective     Pt reports: " I am out of the brace and I feel much better."  Response to previous treatment: pain-free      Pain: 2/10  Location: dorsal wrist and thumb     Objective           Gabriela participated in dynamic functional therapeutic exercises to improve functional performance for 30  minutes, including:  In fluidotherapy machine x 2 minutes each  -all wrist motions , circumduction both directions with 2#wt   - thumb slides  - 3 jaw and lateral pinch with 2#(red) clothespin  - finger extension with blue dig extend  - thumb olvera and radial ABD with yellow band x 2 minutes  -Triceps with 2#wt   -gripping with yellow putty for  strength   - elbow flexion with 2#wt   -thumb opposition with purple thumb helper (yellow band)  - putty presses with yellow for wrist extension x 3 minutes      - strength - 35# on your left hand     Home Exercises and Education Provided       Written Home Exercises Provided:  Supplied putty for ONLY gripping x 3 minutes every other day. Thumb to be added for red and yellow putty . Exercises were reviewed using water bottle at home and Gabriela was able to demonstrate them prior to the end of the session.  Gabriela demonstrated good  understanding of the education provided.     Pt received a written copy of exercises to perform at home.   See EMR under patient instructions for exercises given.     Gabriela demonstrated good  understanding of the education provided.       Assessment   Pt upgraded to 3#wt for forearm and 2#wt wirh all " wrist exercises. Supplied red putty for strength and taught new HEP for every other day.  Gabreila is progressing well towards her goals and there are no updates to goals at this time. Pt prognosis is Good.    Pt continues to be limited in functional and leisurely pursuits. Pain limits patients participation in ADL's. Patient continues to requires cues and skilled supervision to complete HEP     Pt will continue to benefit from skilled outpatient occupational therapy to address the deficits listed in the problem list on initial evaluation provide pt/family education and to maximize pt's level of independence in the home and community environment.     Anticipated barriers to occupational therapy: n/a    Pt's spiritual, cultural and educational needs considered and pt agreeable to plan of care and goals.    Goals:      Goals to be met in 6-8  weeks:    1) Patient to be IND with HEP and modalities for pain managment.  2) Patient will  Increase AROM 15-20 degrees in hand/wrist to increase functional hand use for ADLs/work/leisure activities.  3)Pt will increase  strength 10-20 lbs. to improve functional grasp for ADLs/work/leisure activities.   4) Pt will increase pinch strength in all 3 limited positions by 1-3 psi to assist with manipulation and fine motor task    Plan   Continue 1-2x week x 6-8  weeks during the certification period from  10-16-18 to 12-16-18   in pursuit of OT goals.    Discussed Plan of Care with patient: Yes  Updates/Grading for next session: donna Begum, OT , CHT

## 2018-11-13 ENCOUNTER — CLINICAL SUPPORT (OUTPATIENT)
Dept: REHABILITATION | Facility: HOSPITAL | Age: 23
End: 2018-11-13
Payer: COMMERCIAL

## 2018-11-13 DIAGNOSIS — M25.532 LEFT WRIST PAIN: ICD-10-CM

## 2018-11-13 PROCEDURE — 97022 WHIRLPOOL THERAPY: CPT | Mod: PO

## 2018-11-13 PROCEDURE — 97110 THERAPEUTIC EXERCISES: CPT | Mod: PO

## 2018-11-13 PROCEDURE — 97018 PARAFFIN BATH THERAPY: CPT | Mod: PO

## 2018-11-13 NOTE — PROGRESS NOTES
"                            Occupational Therapy Daily Treatment Note     Name: Gabriela Cooper  Clinic Number: [id number]    Therapy Diagnosis:   Encounter Diagnosis   Name Primary?    Left wrist pain      Physician: Marcie Pool PA      Surgical Procedure: Heidi 15, 2018  Visit # / Visits authorized: 20 / 20  Date of Return to MD: 9-20-18      Time In:9  Time Out: 10  Total Billable Time: 60 minutes    Precautions: Standard    Subjective     Pt reports: " I am out of the brace and I feel much better."  Response to previous treatment: pain-free      Pain: 2/10  Location: dorsal wrist and thumb     Objective           Gabriela participated in dynamic functional therapeutic exercises to improve functional performance for 30  minutes, including:  In fluidotherapy machine x 2 minutes each  -all wrist motions , circumduction both directions with 2#wt   - thumb slides  - finger extension with blue dig extend  - thumb olvera and radial ABD with yellow band x 2 minutes  -gripping with yellow putty for  strength   -thumb opposition with purple thumb helper (yellow band)  - putty presses with yellow for wrist extension x 3 minutes      Range of Motion:   Left Active   11/13/2018   INDEX                          MP Ext/Flex Wnl/wnl                         PIP Ext/Flex Wnl/wnl                         DIP Ext/Flex Wnl/wnl   THUMB                          MP Ext/Flex Wnl/60 (+4)                         IP Ext/Flex Wnl/80 (+4)                          Olvera ABD 55(wnl)                         Radial ABD 45 (wnl)                         Opposition wnl     Comments: patient is now WNL for thumb motion      Wrist ROM: Left  Active  fisted 11/13/2018   Extension 50 (+5)   Flexion 55(+20)   Radial Deviation wnl   Ulnar Deviation 30(+1)   Supination wnl   Pronation wnl        - strength - 34# on your left hand ;39# on right hand      Home Exercises and Education Provided       Written Home Exercises Provided:  " Supplied putty for ONLY gripping x 3 minutes every other day. Thumb to be added for red and yellow putty . Exercises were reviewed using water bottle at home and Gabriela was able to demonstrate them prior to the end of the session.  Gabriela demonstrated good  understanding of the education provided.     Pt received a written copy of exercises to perform at home.   See EMR under patient instructions for exercises given.         Gabriela demonstrated good  understanding of the education provided.         FOTO score with only 14 % limitation at this time      Assessment   Pt is being discharged from skilled OT services, has great gains in all thumb, wrist, and strength components.   Anticipated barriers to occupational therapy: n/a    Pt's spiritual, cultural and educational needs considered and pt agreeable to plan of care and goals.    Goals:      Goals met:    1) Patient to be IND with HEP and modalities for pain managment.(met)  2) Patient will  Increase AROM 15-20 degrees in hand/wrist to increase functional hand use for ADLs/work/leisure activities.(met)  3)Pt will increase  strength 10-20 lbs. to improve functional grasp for ADLs/work/leisure activities. (met)  4) Pt will increase pinch strength in all 3 limited positions by 1-3 psi to assist with manipulation and fine motor task(met)    Plan   Pt is being discharged from skilled OT services as all goals met and has excellent motion and normal  strength now. Pt will continue with HEP strengthening at home 3 times a week and has agreed to this plan.     Chana Begum, OT , CHT

## 2019-04-15 ENCOUNTER — TELEPHONE (OUTPATIENT)
Dept: OBSTETRICS AND GYNECOLOGY | Facility: CLINIC | Age: 24
End: 2019-04-15

## 2019-04-15 DIAGNOSIS — Z34.90 PREGNANCY, UNSPECIFIED GESTATIONAL AGE: Primary | ICD-10-CM

## 2019-04-15 NOTE — TELEPHONE ENCOUNTER
----- Message from Anneliese Hicks sent at 4/15/2019 10:27 AM CDT -----      Who Called: FLAKITA GRANDE [[id number]]    Date of Positive Preg Test: 4/11    1st day of Last Menstrual Cycle: 2/28    List Any Difficulties: Cramping    What Number to Call Back: [phone]        Spoke with patient to get patient schedule for new ob appointment. Patient verbalized and understand

## 2019-05-02 ENCOUNTER — PROCEDURE VISIT (OUTPATIENT)
Dept: OBSTETRICS AND GYNECOLOGY | Facility: CLINIC | Age: 24
End: 2019-05-02
Attending: OBSTETRICS & GYNECOLOGY

## 2019-05-02 ENCOUNTER — OFFICE VISIT (OUTPATIENT)
Dept: OBSTETRICS AND GYNECOLOGY | Facility: CLINIC | Age: 24
End: 2019-05-02

## 2019-05-02 ENCOUNTER — LAB VISIT (OUTPATIENT)
Dept: LAB | Facility: OTHER | Age: 24
End: 2019-05-02
Attending: OBSTETRICS & GYNECOLOGY

## 2019-05-02 VITALS
SYSTOLIC BLOOD PRESSURE: 108 MMHG | WEIGHT: 108.44 LBS | HEIGHT: 60 IN | BODY MASS INDEX: 21.29 KG/M2 | DIASTOLIC BLOOD PRESSURE: 72 MMHG

## 2019-05-02 DIAGNOSIS — N91.2 AMENORRHEA: Primary | ICD-10-CM

## 2019-05-02 DIAGNOSIS — Z34.90 PREGNANCY, UNSPECIFIED GESTATIONAL AGE: ICD-10-CM

## 2019-05-02 DIAGNOSIS — N91.2 AMENORRHEA: ICD-10-CM

## 2019-05-02 LAB
ABO + RH BLD: NORMAL
B-HCG UR QL: POSITIVE
BASOPHILS NFR BLD: 2 % (ref 0–1.9)
BLD GP AB SCN CELLS X3 SERPL QL: NORMAL
CTP QC/QA: YES
DIFFERENTIAL METHOD: ABNORMAL
EOSINOPHIL NFR BLD: 1 % (ref 0–8)
ERYTHROCYTE [DISTWIDTH] IN BLOOD BY AUTOMATED COUNT: 12.3 % (ref 11.5–14.5)
ESTIMATED AVG GLUCOSE: 105 MG/DL (ref 68–131)
HBA1C MFR BLD HPLC: 5.3 % (ref 4–5.6)
HCT VFR BLD AUTO: 37.4 % (ref 37–48.5)
HGB BLD-MCNC: 12.9 G/DL (ref 12–16)
LYMPHOCYTES NFR BLD: 45 % (ref 18–48)
MCH RBC QN AUTO: 29.8 PG (ref 27–31)
MCHC RBC AUTO-ENTMCNC: 34.5 G/DL (ref 32–36)
MCV RBC AUTO: 86 FL (ref 82–98)
MONOCYTES NFR BLD: 11 % (ref 4–15)
NEUTROPHILS NFR BLD: 41 % (ref 38–73)
PLATELET # BLD AUTO: 308 K/UL (ref 150–350)
PLATELET BLD QL SMEAR: ABNORMAL
PMV BLD AUTO: 9.4 FL (ref 9.2–12.9)
RBC # BLD AUTO: 4.33 M/UL (ref 4–5.4)
WBC # BLD AUTO: 3.4 K/UL (ref 3.9–12.7)

## 2019-05-02 PROCEDURE — 99999 PR PBB SHADOW E&M-EST. PATIENT-LVL III: ICD-10-PCS | Mod: PBBFAC,,, | Performed by: OBSTETRICS & GYNECOLOGY

## 2019-05-02 PROCEDURE — 76801 OB US < 14 WKS SINGLE FETUS: CPT | Mod: PBBFAC | Performed by: OBSTETRICS & GYNECOLOGY

## 2019-05-02 PROCEDURE — 88141 LIQUID-BASED PAP SMEAR, SCREENING: ICD-10-PCS | Mod: ,,, | Performed by: PATHOLOGY

## 2019-05-02 PROCEDURE — 83036 HEMOGLOBIN GLYCOSYLATED A1C: CPT

## 2019-05-02 PROCEDURE — 88175 CYTOPATH C/V AUTO FLUID REDO: CPT | Performed by: PATHOLOGY

## 2019-05-02 PROCEDURE — 85007 BL SMEAR W/DIFF WBC COUNT: CPT

## 2019-05-02 PROCEDURE — 87086 URINE CULTURE/COLONY COUNT: CPT

## 2019-05-02 PROCEDURE — 81025 URINE PREGNANCY TEST: CPT | Mod: PBBFAC | Performed by: OBSTETRICS & GYNECOLOGY

## 2019-05-02 PROCEDURE — 99204 PR OFFICE/OUTPT VISIT, NEW, LEVL IV, 45-59 MIN: ICD-10-PCS | Mod: S$PBB,,, | Performed by: OBSTETRICS & GYNECOLOGY

## 2019-05-02 PROCEDURE — 99213 OFFICE O/P EST LOW 20 MIN: CPT | Mod: PBBFAC,25 | Performed by: OBSTETRICS & GYNECOLOGY

## 2019-05-02 PROCEDURE — 86850 RBC ANTIBODY SCREEN: CPT

## 2019-05-02 PROCEDURE — 87340 HEPATITIS B SURFACE AG IA: CPT

## 2019-05-02 PROCEDURE — 36415 COLL VENOUS BLD VENIPUNCTURE: CPT

## 2019-05-02 PROCEDURE — 76801 OB US < 14 WKS SINGLE FETUS: CPT | Mod: 26,S$PBB,, | Performed by: OBSTETRICS & GYNECOLOGY

## 2019-05-02 PROCEDURE — 85027 COMPLETE CBC AUTOMATED: CPT

## 2019-05-02 PROCEDURE — 86703 HIV-1/HIV-2 1 RESULT ANTBDY: CPT

## 2019-05-02 PROCEDURE — 83020 HEMOGLOBIN ELECTROPHORESIS: CPT

## 2019-05-02 PROCEDURE — 87491 CHLMYD TRACH DNA AMP PROBE: CPT

## 2019-05-02 PROCEDURE — 88141 CYTOPATH C/V INTERPRET: CPT | Mod: ,,, | Performed by: PATHOLOGY

## 2019-05-02 PROCEDURE — 99999 PR PBB SHADOW E&M-EST. PATIENT-LVL III: CPT | Mod: PBBFAC,,, | Performed by: OBSTETRICS & GYNECOLOGY

## 2019-05-02 PROCEDURE — 86762 RUBELLA ANTIBODY: CPT

## 2019-05-02 PROCEDURE — 76801 PR US, OB <14WKS, TRANSABD, SINGLE GESTATION: ICD-10-PCS | Mod: 26,S$PBB,, | Performed by: OBSTETRICS & GYNECOLOGY

## 2019-05-02 PROCEDURE — 86592 SYPHILIS TEST NON-TREP QUAL: CPT

## 2019-05-02 PROCEDURE — 99204 OFFICE O/P NEW MOD 45 MIN: CPT | Mod: S$PBB,,, | Performed by: OBSTETRICS & GYNECOLOGY

## 2019-05-02 RX ORDER — ONDANSETRON 4 MG/1
4 TABLET, FILM COATED ORAL DAILY PRN
Qty: 30 TABLET | Refills: 1 | Status: SHIPPED | OUTPATIENT
Start: 2019-05-02 | End: 2019-07-25

## 2019-05-02 RX ORDER — PROMETHAZINE HYDROCHLORIDE 25 MG/1
25 TABLET ORAL EVERY 4 HOURS
Qty: 30 TABLET | Refills: 1 | Status: SHIPPED | OUTPATIENT
Start: 2019-05-02 | End: 2019-07-25

## 2019-05-02 NOTE — PROGRESS NOTES
HPI: Pt is a 23 y.o. female who presents complaining of missed menses and (+) home UPT. She denies vaginal bleeding or abdominal pain. She does have nausea and vomiting. LMP: 19, about 9 weeks pregnant.    ObHx: ) 9 week SAb with D&C  G2) current  GynHx: denies  PMH: denies  PSH: wrist surgery  Meds: pnv, b6  All: none  SH: former marijuana and cocaine, stopped since pregnant  FH: none    ROS:  GENERAL: Nauseated  SKIN: Denies rash or lesions.   HEAD: Denies head injury or headache.   NODES: Denies enlarged lymph nodes.   CHEST: Denies chest pain or shortness of breath.   CARDIOVASCULAR: Denies palpitations or left sided chest pain.   ABDOMEN: No abdominal pain, constipation, diarrhea or rectal bleeding.   URINARY: No dysuria, hematuria, or burning on urination.  REPRODUCTIVE: See HPI.   BREASTS: Denies pain, lumps, or nipple discharge.   HEMATOLOGIC: No easy bruisability or excessive bleeding.   MUSCULOSKELETAL: Denies joint pain or swelling.   NEUROLOGIC: Denies syncope or weakness.   PSYCHIATRIC: Denies depression, anxiety or mood swings.    PE:   APPEARANCE: Well nourished, well developed, in no acute distress.  AFFECT: WNL, alert and oriented x 3.  SKIN: No acne or hirsutism.  NECK: Neck symmetric, without masses or thyromegaly.  NODES: No inguinal, cervical, axillary or femoral lymph node enlargement.  CHEST: Good respiratory effort.   ABDOMEN: Soft. No tenderness or masses. No hepatosplenomegaly. No hernias.  PELVIC: External female genitalia without lesions. Female hair distribution. Adequate perineal body, Normal urethral meatus. Vagina moist and well rugated without lesions or discharge. There is no significant cystocele or rectocele. Cervix pink without lesions, discharge or tenderness.   EXTREMITIES: No edema.    PROCEDURES:  - UPT: positive  - Urine dip: negative  - Dating U/S: to be scheduled with GynUS      Diagnosis:  1. Amenorrhea        Plan:     Orders Placed This Encounter     Urine culture    C. trachomatis/N. gonorrhoeae by AMP DNA    HIV 1/2 Ag/Ab (4th Gen)    RPR    Hemoglobin Electrophoresis,Hgb A2 Riaz.    Hepatitis B surface antigen    Rubella antibody, IgG    Hemoglobin A1c    CBC auto differential    POCT urine pregnancy    Type & Screen - Ob Profile    Liquid-based pap smear, screening        - Rx: Prenatal vitamins  - She is considering want 1st trimester screening with MFM.     Patient was counseled today on routine 1st trimester precautions, including vaginal bleeding and abdominal pain. We also discussed proper weight gain based on the [place] [employer] [facility]'s recommendations based on her pre-pregnancy weight, foods to avoid in pregnancy (i.e. sushi, fish that are high in mercury, cold deli meat, and unpasteurized cheeses), environmental precautions such as cat litter, and prenatal vitamin options (i.e. stool softener, DHA).     Total face to face time spent with the patient was 20 minutes and over half spent in counseling on the above related issues.     Follow-up with me in 4 weeks for OB check

## 2019-05-03 LAB
BACTERIA UR CULT: NO GROWTH
C TRACH DNA SPEC QL NAA+PROBE: NOT DETECTED
HBV SURFACE AG SERPL QL IA: NEGATIVE
HGB A2 MFR BLD HPLC: 2.9 % (ref 2.2–3.2)
HGB FRACT BLD ELPH-IMP: NORMAL
HGB FRACT BLD ELPH-IMP: NORMAL
HIV 1+2 AB+HIV1 P24 AG SERPL QL IA: NEGATIVE
N GONORRHOEA DNA SPEC QL NAA+PROBE: NOT DETECTED
RPR SER QL: NORMAL
RUBV IGG SER-ACNC: 36.3 IU/ML
RUBV IGG SER-IMP: REACTIVE

## 2019-05-13 NOTE — PROGRESS NOTES
Addended by: LORE OCHOA on: 5/13/2019 01:11 PM     Modules accepted: Level of Service     Reapplied thumb spica cast w/port

## 2019-05-30 ENCOUNTER — INITIAL PRENATAL (OUTPATIENT)
Dept: OBSTETRICS AND GYNECOLOGY | Facility: CLINIC | Age: 24
End: 2019-05-30

## 2019-05-30 VITALS
WEIGHT: 110.69 LBS | SYSTOLIC BLOOD PRESSURE: 104 MMHG | BODY MASS INDEX: 21.61 KG/M2 | DIASTOLIC BLOOD PRESSURE: 68 MMHG

## 2019-05-30 DIAGNOSIS — Z34.01 ENCOUNTER FOR SUPERVISION OF NORMAL FIRST PREGNANCY IN FIRST TRIMESTER: Primary | ICD-10-CM

## 2019-05-30 PROCEDURE — 99212 OFFICE O/P EST SF 10 MIN: CPT | Mod: PBBFAC | Performed by: OBSTETRICS & GYNECOLOGY

## 2019-05-30 PROCEDURE — 99999 PR PBB SHADOW E&M-EST. PATIENT-LVL II: CPT | Mod: PBBFAC,,, | Performed by: OBSTETRICS & GYNECOLOGY

## 2019-05-30 PROCEDURE — 0500F PR INITIAL PRENATAL CARE VISIT: ICD-10-PCS | Mod: ,,, | Performed by: OBSTETRICS & GYNECOLOGY

## 2019-05-30 PROCEDURE — 99999 PR PBB SHADOW E&M-EST. PATIENT-LVL II: ICD-10-PCS | Mod: PBBFAC,,, | Performed by: OBSTETRICS & GYNECOLOGY

## 2019-05-30 PROCEDURE — 0500F INITIAL PRENATAL CARE VISIT: CPT | Mod: ,,, | Performed by: OBSTETRICS & GYNECOLOGY

## 2019-05-30 NOTE — PROGRESS NOTES
no LOF, Ctx, VB. FHT on bedside US today. Wants Mat21, will get today - pt aware of cost out of pocket. Precautions given.

## 2019-06-03 ENCOUNTER — TELEPHONE (OUTPATIENT)
Dept: OBSTETRICS AND GYNECOLOGY | Facility: CLINIC | Age: 24
End: 2019-06-03

## 2019-06-06 ENCOUNTER — PATIENT MESSAGE (OUTPATIENT)
Dept: OBSTETRICS AND GYNECOLOGY | Facility: CLINIC | Age: 24
End: 2019-06-06

## 2019-06-18 ENCOUNTER — PATIENT MESSAGE (OUTPATIENT)
Dept: OBSTETRICS AND GYNECOLOGY | Facility: CLINIC | Age: 24
End: 2019-06-18
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
## 2019-06-27 ENCOUNTER — ROUTINE PRENATAL (OUTPATIENT)
Dept: OBSTETRICS AND GYNECOLOGY | Facility: CLINIC | Age: 24
End: 2019-06-27

## 2019-06-27 ENCOUNTER — LAB VISIT (OUTPATIENT)
Dept: LAB | Facility: OTHER | Age: 24
End: 2019-06-27

## 2019-06-27 VITALS
BODY MASS INDEX: 21.66 KG/M2 | DIASTOLIC BLOOD PRESSURE: 64 MMHG | WEIGHT: 110.88 LBS | SYSTOLIC BLOOD PRESSURE: 104 MMHG

## 2019-06-27 DIAGNOSIS — Z34.02 ENCOUNTER FOR SUPERVISION OF NORMAL FIRST PREGNANCY IN SECOND TRIMESTER: ICD-10-CM

## 2019-06-27 DIAGNOSIS — Z34.02 ENCOUNTER FOR SUPERVISION OF NORMAL FIRST PREGNANCY IN SECOND TRIMESTER: Primary | ICD-10-CM

## 2019-06-27 PROCEDURE — 82105 ALPHA-FETOPROTEIN SERUM: CPT

## 2019-06-27 PROCEDURE — 36415 COLL VENOUS BLD VENIPUNCTURE: CPT

## 2019-06-27 PROCEDURE — 99212 OFFICE O/P EST SF 10 MIN: CPT | Mod: PBBFAC | Performed by: OBSTETRICS & GYNECOLOGY

## 2019-06-27 PROCEDURE — 0502F PR SUBSEQUENT PRENATAL CARE: ICD-10-PCS | Mod: ,,, | Performed by: OBSTETRICS & GYNECOLOGY

## 2019-06-27 PROCEDURE — 99999 PR PBB SHADOW E&M-EST. PATIENT-LVL II: ICD-10-PCS | Mod: PBBFAC,,, | Performed by: OBSTETRICS & GYNECOLOGY

## 2019-06-27 PROCEDURE — 0502F SUBSEQUENT PRENATAL CARE: CPT | Mod: ,,, | Performed by: OBSTETRICS & GYNECOLOGY

## 2019-06-27 PROCEDURE — 99999 PR PBB SHADOW E&M-EST. PATIENT-LVL II: CPT | Mod: PBBFAC,,, | Performed by: OBSTETRICS & GYNECOLOGY
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
## 2019-07-02 LAB
# FETUSES US: NORMAL
AFP INTERPRETATION: NORMAL
AFP MOM SERPL: 0.82
AFP SERPL-MCNC: 28 NG/ML
AFP SERPL-MCNC: NEGATIVE NG/ML
AGE AT DELIVERY: 25
GA (DAYS): 3 D
GA (WEEKS): 15 WK
GA METHOD: NORMAL
IDDM PATIENT QL: NORMAL
SMOKING STATUS FTND: NO

## 2019-07-25 ENCOUNTER — ROUTINE PRENATAL (OUTPATIENT)
Dept: OBSTETRICS AND GYNECOLOGY | Facility: CLINIC | Age: 24
End: 2019-07-25

## 2019-07-25 VITALS
DIASTOLIC BLOOD PRESSURE: 60 MMHG | BODY MASS INDEX: 23.08 KG/M2 | WEIGHT: 118.19 LBS | SYSTOLIC BLOOD PRESSURE: 108 MMHG

## 2019-07-25 DIAGNOSIS — Z34.02 ENCOUNTER FOR SUPERVISION OF NORMAL FIRST PREGNANCY IN SECOND TRIMESTER: Primary | ICD-10-CM

## 2019-07-25 PROCEDURE — 99999 PR PBB SHADOW E&M-EST. PATIENT-LVL II: ICD-10-PCS | Mod: PBBFAC,,, | Performed by: OBSTETRICS & GYNECOLOGY

## 2019-07-25 PROCEDURE — 0502F SUBSEQUENT PRENATAL CARE: CPT | Mod: ,,, | Performed by: OBSTETRICS & GYNECOLOGY

## 2019-07-25 PROCEDURE — 99212 OFFICE O/P EST SF 10 MIN: CPT | Mod: PBBFAC | Performed by: OBSTETRICS & GYNECOLOGY

## 2019-07-25 PROCEDURE — 0502F PR SUBSEQUENT PRENATAL CARE: ICD-10-PCS | Mod: ,,, | Performed by: OBSTETRICS & GYNECOLOGY

## 2019-07-25 PROCEDURE — 99999 PR PBB SHADOW E&M-EST. PATIENT-LVL II: CPT | Mod: PBBFAC,,, | Performed by: OBSTETRICS & GYNECOLOGY

## 2019-07-29 ENCOUNTER — PROCEDURE VISIT (OUTPATIENT)
Dept: MATERNAL FETAL MEDICINE | Facility: CLINIC | Age: 24
End: 2019-07-29

## 2019-07-29 DIAGNOSIS — O28.3 ECHOGENIC INTRACARDIAC FOCUS OF FETUS ON PRENATAL ULTRASOUND: ICD-10-CM

## 2019-07-29 DIAGNOSIS — Z34.02 ENCOUNTER FOR SUPERVISION OF NORMAL FIRST PREGNANCY IN SECOND TRIMESTER: ICD-10-CM

## 2019-07-29 PROCEDURE — 99499 NO LOS: ICD-10-PCS | Mod: S$PBB,,, | Performed by: OBSTETRICS & GYNECOLOGY

## 2019-07-29 PROCEDURE — 76811 OB US DETAILED SNGL FETUS: CPT | Mod: PBBFAC | Performed by: OBSTETRICS & GYNECOLOGY

## 2019-07-29 PROCEDURE — 76811 PR US, OB FETAL EVAL & EXAM, TRANSABDOM,FIRST GESTATION: ICD-10-PCS | Mod: 26,S$PBB,, | Performed by: OBSTETRICS & GYNECOLOGY

## 2019-07-29 PROCEDURE — 99499 UNLISTED E&M SERVICE: CPT | Mod: S$PBB,,, | Performed by: OBSTETRICS & GYNECOLOGY

## 2019-07-29 PROCEDURE — 76811 OB US DETAILED SNGL FETUS: CPT | Mod: 26,S$PBB,, | Performed by: OBSTETRICS & GYNECOLOGY

## 2019-08-22 ENCOUNTER — ROUTINE PRENATAL (OUTPATIENT)
Dept: OBSTETRICS AND GYNECOLOGY | Facility: CLINIC | Age: 24
End: 2019-08-22

## 2019-08-22 VITALS
WEIGHT: 126.13 LBS | BODY MASS INDEX: 24.63 KG/M2 | DIASTOLIC BLOOD PRESSURE: 68 MMHG | SYSTOLIC BLOOD PRESSURE: 112 MMHG

## 2019-08-22 DIAGNOSIS — Z34.02 ENCOUNTER FOR SUPERVISION OF NORMAL FIRST PREGNANCY IN SECOND TRIMESTER: Primary | ICD-10-CM

## 2019-08-22 PROCEDURE — 99212 OFFICE O/P EST SF 10 MIN: CPT | Mod: PBBFAC | Performed by: OBSTETRICS & GYNECOLOGY

## 2019-08-22 PROCEDURE — 99999 PR PBB SHADOW E&M-EST. PATIENT-LVL II: ICD-10-PCS | Mod: PBBFAC,,, | Performed by: OBSTETRICS & GYNECOLOGY

## 2019-08-22 PROCEDURE — 0502F PR SUBSEQUENT PRENATAL CARE: ICD-10-PCS | Mod: ,,, | Performed by: OBSTETRICS & GYNECOLOGY

## 2019-08-22 PROCEDURE — 0502F SUBSEQUENT PRENATAL CARE: CPT | Mod: ,,, | Performed by: OBSTETRICS & GYNECOLOGY

## 2019-08-22 PROCEDURE — 99999 PR PBB SHADOW E&M-EST. PATIENT-LVL II: CPT | Mod: PBBFAC,,, | Performed by: OBSTETRICS & GYNECOLOGY

## 2019-08-22 NOTE — PROGRESS NOTES
Good FM, no LOF, Ctx, VB. Doing glucose and CBC with Tdap next visit. Getting  ni one month. Wants Nitrous for delivery. Preaacutions given.

## 2019-09-19 ENCOUNTER — LAB VISIT (OUTPATIENT)
Dept: LAB | Facility: OTHER | Age: 24
End: 2019-09-19
Attending: OBSTETRICS & GYNECOLOGY

## 2019-09-19 ENCOUNTER — CLINICAL SUPPORT (OUTPATIENT)
Dept: OBSTETRICS AND GYNECOLOGY | Facility: CLINIC | Age: 24
End: 2019-09-19

## 2019-09-19 ENCOUNTER — ROUTINE PRENATAL (OUTPATIENT)
Dept: OBSTETRICS AND GYNECOLOGY | Facility: CLINIC | Age: 24
End: 2019-09-19

## 2019-09-19 VITALS
DIASTOLIC BLOOD PRESSURE: 60 MMHG | BODY MASS INDEX: 25.92 KG/M2 | SYSTOLIC BLOOD PRESSURE: 102 MMHG | WEIGHT: 132.69 LBS

## 2019-09-19 DIAGNOSIS — Z34.02 ENCOUNTER FOR SUPERVISION OF NORMAL FIRST PREGNANCY IN SECOND TRIMESTER: ICD-10-CM

## 2019-09-19 DIAGNOSIS — Z34.02 ENCOUNTER FOR SUPERVISION OF NORMAL FIRST PREGNANCY IN SECOND TRIMESTER: Primary | ICD-10-CM

## 2019-09-19 LAB
BASOPHILS # BLD AUTO: 0.01 K/UL (ref 0–0.2)
BASOPHILS NFR BLD: 0.1 % (ref 0–1.9)
DIFFERENTIAL METHOD: ABNORMAL
EOSINOPHIL # BLD AUTO: 0 K/UL (ref 0–0.5)
EOSINOPHIL NFR BLD: 0.4 % (ref 0–8)
ERYTHROCYTE [DISTWIDTH] IN BLOOD BY AUTOMATED COUNT: 12.6 % (ref 11.5–14.5)
GLUCOSE SERPL-MCNC: 132 MG/DL (ref 70–140)
HCT VFR BLD AUTO: 34.2 % (ref 37–48.5)
HGB BLD-MCNC: 11.3 G/DL (ref 12–16)
IMM GRANULOCYTES # BLD AUTO: 0.05 K/UL (ref 0–0.04)
IMM GRANULOCYTES NFR BLD AUTO: 0.5 % (ref 0–0.5)
LYMPHOCYTES # BLD AUTO: 1.6 K/UL (ref 1–4.8)
LYMPHOCYTES NFR BLD: 15.5 % (ref 18–48)
MCH RBC QN AUTO: 29.4 PG (ref 27–31)
MCHC RBC AUTO-ENTMCNC: 33 G/DL (ref 32–36)
MCV RBC AUTO: 89 FL (ref 82–98)
MONOCYTES # BLD AUTO: 0.7 K/UL (ref 0.3–1)
MONOCYTES NFR BLD: 6.8 % (ref 4–15)
NEUTROPHILS # BLD AUTO: 7.9 K/UL (ref 1.8–7.7)
NEUTROPHILS NFR BLD: 76.7 % (ref 38–73)
NRBC BLD-RTO: 0 /100 WBC
PLATELET # BLD AUTO: 254 K/UL (ref 150–350)
PMV BLD AUTO: 9.4 FL (ref 9.2–12.9)
RBC # BLD AUTO: 3.84 M/UL (ref 4–5.4)
WBC # BLD AUTO: 10.32 K/UL (ref 3.9–12.7)

## 2019-09-19 PROCEDURE — 99999 PR PBB SHADOW E&M-EST. PATIENT-LVL I: CPT | Mod: PBBFAC,,,

## 2019-09-19 PROCEDURE — 99212 OFFICE O/P EST SF 10 MIN: CPT | Mod: PBBFAC,25 | Performed by: OBSTETRICS & GYNECOLOGY

## 2019-09-19 PROCEDURE — 85025 COMPLETE CBC W/AUTO DIFF WBC: CPT

## 2019-09-19 PROCEDURE — 82950 GLUCOSE TEST: CPT

## 2019-09-19 PROCEDURE — 90472 IMMUNIZATION ADMIN EACH ADD: CPT | Mod: PBBFAC

## 2019-09-19 PROCEDURE — 99999 PR PBB SHADOW E&M-EST. PATIENT-LVL II: ICD-10-PCS | Mod: PBBFAC,,, | Performed by: OBSTETRICS & GYNECOLOGY

## 2019-09-19 PROCEDURE — 90715 TDAP VACCINE 7 YRS/> IM: CPT | Mod: PBBFAC

## 2019-09-19 PROCEDURE — 99999 PR PBB SHADOW E&M-EST. PATIENT-LVL II: CPT | Mod: PBBFAC,,, | Performed by: OBSTETRICS & GYNECOLOGY

## 2019-09-19 PROCEDURE — 90471 IMMUNIZATION ADMIN: CPT | Mod: PBBFAC

## 2019-09-19 PROCEDURE — 99999 PR PBB SHADOW E&M-EST. PATIENT-LVL I: ICD-10-PCS | Mod: PBBFAC,,,

## 2019-09-19 PROCEDURE — 0502F PR SUBSEQUENT PRENATAL CARE: ICD-10-PCS | Mod: ,,, | Performed by: OBSTETRICS & GYNECOLOGY

## 2019-09-19 PROCEDURE — 99211 OFF/OP EST MAY X REQ PHY/QHP: CPT | Mod: PBBFAC,27

## 2019-09-19 PROCEDURE — 0502F SUBSEQUENT PRENATAL CARE: CPT | Mod: ,,, | Performed by: OBSTETRICS & GYNECOLOGY

## 2019-09-19 PROCEDURE — 36415 COLL VENOUS BLD VENIPUNCTURE: CPT

## 2019-09-19 NOTE — PROGRESS NOTES
Good FM, no LOF, Ctx, VB.   Getting  this weekend. Preacutions given. CBC, glucose, tdap, flu today.

## 2019-09-19 NOTE — PROGRESS NOTES
Here for TDAP injection. Patient without complaint of pain at this time ,Injection given. Tolerated well. No pain noted after injection.  Advised to wait in lobby 15 minutes and report any adverse reactions.     Site: [facility]    Baby Friendly Handout Given to Patient            Here for Influenza - Quadrivalent - PF (ADULT) vaccine . Vaccine Information sheet given to patient. Patient without complaint of pain prior to or after injection. Immunization tolerated well and patient advised to wait in lobby 15 minutes. Report any adverse reactions.    Site: [facility]

## 2019-10-08 ENCOUNTER — ROUTINE PRENATAL (OUTPATIENT)
Dept: OBSTETRICS AND GYNECOLOGY | Facility: CLINIC | Age: 24
End: 2019-10-08

## 2019-10-08 VITALS
BODY MASS INDEX: 26.33 KG/M2 | DIASTOLIC BLOOD PRESSURE: 64 MMHG | WEIGHT: 134.81 LBS | SYSTOLIC BLOOD PRESSURE: 104 MMHG

## 2019-10-08 DIAGNOSIS — Z34.83 ENCOUNTER FOR SUPERVISION OF OTHER NORMAL PREGNANCY IN THIRD TRIMESTER: Primary | ICD-10-CM

## 2019-10-08 PROCEDURE — 0502F PR SUBSEQUENT PRENATAL CARE: ICD-10-PCS | Mod: S$GLB,,, | Performed by: OBSTETRICS & GYNECOLOGY

## 2019-10-08 PROCEDURE — 0502F SUBSEQUENT PRENATAL CARE: CPT | Mod: S$GLB,,, | Performed by: OBSTETRICS & GYNECOLOGY

## 2019-10-22 ENCOUNTER — ROUTINE PRENATAL (OUTPATIENT)
Dept: OBSTETRICS AND GYNECOLOGY | Facility: CLINIC | Age: 24
End: 2019-10-22

## 2019-10-22 VITALS
DIASTOLIC BLOOD PRESSURE: 76 MMHG | SYSTOLIC BLOOD PRESSURE: 102 MMHG | WEIGHT: 139.13 LBS | BODY MASS INDEX: 27.17 KG/M2

## 2019-10-22 DIAGNOSIS — Z34.83 ENCOUNTER FOR SUPERVISION OF OTHER NORMAL PREGNANCY IN THIRD TRIMESTER: Primary | ICD-10-CM

## 2019-10-22 PROCEDURE — 0502F SUBSEQUENT PRENATAL CARE: CPT | Mod: S$GLB,,, | Performed by: OBSTETRICS & GYNECOLOGY

## 2019-10-22 PROCEDURE — 0502F PR SUBSEQUENT PRENATAL CARE: ICD-10-PCS | Mod: S$GLB,,, | Performed by: OBSTETRICS & GYNECOLOGY

## 2019-10-22 NOTE — PROGRESS NOTES
Good FM, no LOF, Ctx, VB. Given prenatal class schedule. Plans to breastfeed. Thinking about pediatrician. Advised to take Tums for GERD. Precautions given.

## 2019-11-05 ENCOUNTER — ROUTINE PRENATAL (OUTPATIENT)
Dept: OBSTETRICS AND GYNECOLOGY | Facility: CLINIC | Age: 24
End: 2019-11-05

## 2019-11-05 VITALS — SYSTOLIC BLOOD PRESSURE: 102 MMHG | DIASTOLIC BLOOD PRESSURE: 60 MMHG | WEIGHT: 142.5 LBS | BODY MASS INDEX: 27.84 KG/M2

## 2019-11-05 DIAGNOSIS — Z34.83 ENCOUNTER FOR SUPERVISION OF OTHER NORMAL PREGNANCY IN THIRD TRIMESTER: Primary | ICD-10-CM

## 2019-11-05 PROCEDURE — 0502F SUBSEQUENT PRENATAL CARE: CPT | Mod: S$GLB,,, | Performed by: OBSTETRICS & GYNECOLOGY

## 2019-11-05 PROCEDURE — 0502F PR SUBSEQUENT PRENATAL CARE: ICD-10-PCS | Mod: S$GLB,,, | Performed by: OBSTETRICS & GYNECOLOGY

## 2019-11-05 NOTE — PROGRESS NOTES
Good FM, no LOF, Ctx, VB.   Cultures next visit. Precautions given. Will see Bernard from now on. List given for pediatricians.

## 2019-11-20 ENCOUNTER — ROUTINE PRENATAL (OUTPATIENT)
Dept: OBSTETRICS AND GYNECOLOGY | Facility: CLINIC | Age: 24
End: 2019-11-20

## 2019-11-20 VITALS — BODY MASS INDEX: 28.68 KG/M2 | WEIGHT: 146.81 LBS

## 2019-11-20 DIAGNOSIS — Z3A.36 36 WEEKS GESTATION OF PREGNANCY: Primary | ICD-10-CM

## 2019-11-20 PROCEDURE — 87081 CULTURE SCREEN ONLY: CPT

## 2019-11-20 PROCEDURE — 0502F PR SUBSEQUENT PRENATAL CARE: ICD-10-PCS | Mod: ,,, | Performed by: OBSTETRICS & GYNECOLOGY

## 2019-11-20 PROCEDURE — 0502F SUBSEQUENT PRENATAL CARE: CPT | Mod: ,,, | Performed by: OBSTETRICS & GYNECOLOGY

## 2019-11-20 PROCEDURE — 99212 OFFICE O/P EST SF 10 MIN: CPT | Mod: PBBFAC | Performed by: OBSTETRICS & GYNECOLOGY

## 2019-11-20 PROCEDURE — 99999 PR PBB SHADOW E&M-EST. PATIENT-LVL II: CPT | Mod: PBBFAC,,, | Performed by: OBSTETRICS & GYNECOLOGY

## 2019-11-20 PROCEDURE — 99999 PR PBB SHADOW E&M-EST. PATIENT-LVL II: ICD-10-PCS | Mod: PBBFAC,,, | Performed by: OBSTETRICS & GYNECOLOGY

## 2019-11-20 NOTE — PROGRESS NOTES
Doing well. Some [place].   BP not documented in Epic, but was normal.   Cervix 3-4/50/-2.   RTO in 1 week.    Megan Wagner MD  Obstetrics and Gynecology  [facility]

## 2019-11-22 LAB — BACTERIA SPEC AEROBE CULT: NORMAL

## 2019-11-25 ENCOUNTER — ROUTINE PRENATAL (OUTPATIENT)
Dept: OBSTETRICS AND GYNECOLOGY | Facility: CLINIC | Age: 24
End: 2019-11-25

## 2019-11-25 VITALS
SYSTOLIC BLOOD PRESSURE: 118 MMHG | DIASTOLIC BLOOD PRESSURE: 60 MMHG | BODY MASS INDEX: 28.85 KG/M2 | WEIGHT: 147.69 LBS

## 2019-11-25 DIAGNOSIS — Z3A.37 37 WEEKS GESTATION OF PREGNANCY: Primary | ICD-10-CM

## 2019-11-25 PROCEDURE — 99212 OFFICE O/P EST SF 10 MIN: CPT | Mod: PBBFAC | Performed by: OBSTETRICS & GYNECOLOGY

## 2019-11-25 PROCEDURE — 0502F SUBSEQUENT PRENATAL CARE: CPT | Mod: ,,, | Performed by: OBSTETRICS & GYNECOLOGY

## 2019-11-25 PROCEDURE — 0502F PR SUBSEQUENT PRENATAL CARE: ICD-10-PCS | Mod: ,,, | Performed by: OBSTETRICS & GYNECOLOGY

## 2019-11-25 PROCEDURE — 99999 PR PBB SHADOW E&M-EST. PATIENT-LVL II: CPT | Mod: PBBFAC,,, | Performed by: OBSTETRICS & GYNECOLOGY

## 2019-11-25 PROCEDURE — 99999 PR PBB SHADOW E&M-EST. PATIENT-LVL II: ICD-10-PCS | Mod: PBBFAC,,, | Performed by: OBSTETRICS & GYNECOLOGY

## 2019-11-25 NOTE — PROGRESS NOTES
Doing well today. No OB complaints.  RTO in 1 week.    Megan Wagner MD  Obstetrics and Gynecology  [facility]

## 2019-12-01 ENCOUNTER — ANESTHESIA EVENT (OUTPATIENT)
Dept: OBSTETRICS AND GYNECOLOGY | Facility: OTHER | Age: 24
End: 2019-12-01

## 2019-12-01 ENCOUNTER — ANESTHESIA (OUTPATIENT)
Dept: OBSTETRICS AND GYNECOLOGY | Facility: OTHER | Age: 24
End: 2019-12-01

## 2019-12-01 ENCOUNTER — HOSPITAL ENCOUNTER (INPATIENT)
Facility: OTHER | Age: 24
LOS: 3 days | Discharge: HOME | End: 2019-12-04
Attending: OBSTETRICS & GYNECOLOGY | Admitting: OBSTETRICS & GYNECOLOGY

## 2019-12-01 DIAGNOSIS — Z3A.37 PREGNANCY WITH 37 WEEKS COMPLETED GESTATION: ICD-10-CM

## 2019-12-01 PROBLEM — S62.022A CLOSED DISPLACED FRACTURE OF MIDDLE THIRD OF NAVICULAR BONE OF LEFT WRIST: Status: RESOLVED | Noted: 2018-06-08 | Resolved: 2019-12-01

## 2019-12-01 PROBLEM — M25.532 WRIST PAIN, ACUTE, LEFT: Status: RESOLVED | Noted: 2018-09-25 | Resolved: 2019-12-01

## 2019-12-01 PROBLEM — S62.009A SCAPHOID FRACTURE: Status: RESOLVED | Noted: 2018-06-15 | Resolved: 2019-12-01

## 2019-12-01 PROBLEM — M25.532 LEFT WRIST PAIN: Status: RESOLVED | Noted: 2018-09-11 | Resolved: 2019-12-01

## 2019-12-01 LAB
ABO + RH BLD: NORMAL
BASOPHILS # BLD AUTO: 0.02 K/UL (ref 0–0.2)
BASOPHILS NFR BLD: 0.1 % (ref 0–1.9)
BLD GP AB SCN CELLS X3 SERPL QL: NORMAL
DIFFERENTIAL METHOD: ABNORMAL
EOSINOPHIL # BLD AUTO: 0.1 K/UL (ref 0–0.5)
EOSINOPHIL NFR BLD: 0.3 % (ref 0–8)
ERYTHROCYTE [DISTWIDTH] IN BLOOD BY AUTOMATED COUNT: 12.9 % (ref 11.5–14.5)
HCT VFR BLD AUTO: 38 % (ref 37–48.5)
HGB BLD-MCNC: 12.5 G/DL (ref 12–16)
IMM GRANULOCYTES # BLD AUTO: 0.06 K/UL (ref 0–0.04)
IMM GRANULOCYTES NFR BLD AUTO: 0.4 % (ref 0–0.5)
LYMPHOCYTES # BLD AUTO: 1.9 K/UL (ref 1–4.8)
LYMPHOCYTES NFR BLD: 12.7 % (ref 18–48)
MCH RBC QN AUTO: 28.9 PG (ref 27–31)
MCHC RBC AUTO-ENTMCNC: 32.9 G/DL (ref 32–36)
MCV RBC AUTO: 88 FL (ref 82–98)
MONOCYTES # BLD AUTO: 1.1 K/UL (ref 0.3–1)
MONOCYTES NFR BLD: 7.5 % (ref 4–15)
NEUTROPHILS # BLD AUTO: 11.5 K/UL (ref 1.8–7.7)
NEUTROPHILS NFR BLD: 79 % (ref 38–73)
NRBC BLD-RTO: 0 /100 WBC
PLATELET # BLD AUTO: 290 K/UL (ref 150–350)
PMV BLD AUTO: 9.6 FL (ref 9.2–12.9)
RBC # BLD AUTO: 4.33 M/UL (ref 4–5.4)
WBC # BLD AUTO: 14.59 K/UL (ref 3.9–12.7)

## 2019-12-01 PROCEDURE — 11000001 HC ACUTE MED/SURG PRIVATE ROOM

## 2019-12-01 PROCEDURE — 25000003 PHARM REV CODE 250: Performed by: STUDENT IN AN ORGANIZED HEALTH CARE EDUCATION/TRAINING PROGRAM

## 2019-12-01 PROCEDURE — 85025 COMPLETE CBC W/AUTO DIFF WBC: CPT

## 2019-12-01 PROCEDURE — 59025 FETAL NON-STRESS TEST: CPT

## 2019-12-01 PROCEDURE — 63600175 PHARM REV CODE 636 W HCPCS: Performed by: STUDENT IN AN ORGANIZED HEALTH CARE EDUCATION/TRAINING PROGRAM

## 2019-12-01 PROCEDURE — 86901 BLOOD TYPING SEROLOGIC RH(D): CPT

## 2019-12-01 PROCEDURE — 86592 SYPHILIS TEST NON-TREP QUAL: CPT

## 2019-12-01 PROCEDURE — 86703 HIV-1/HIV-2 1 RESULT ANTBDY: CPT

## 2019-12-01 PROCEDURE — 99285 EMERGENCY DEPT VISIT HI MDM: CPT | Mod: 25

## 2019-12-01 RX ORDER — OXYTOCIN/RINGER'S LACTATE 30/500 ML
2 PLASTIC BAG, INJECTION (ML) INTRAVENOUS CONTINUOUS
Status: DISCONTINUED | OUTPATIENT
Start: 2019-12-01 | End: 2019-12-02

## 2019-12-01 RX ORDER — OXYTOCIN/RINGER'S LACTATE 30/500 ML
334 PLASTIC BAG, INJECTION (ML) INTRAVENOUS ONCE
Status: COMPLETED | OUTPATIENT
Start: 2019-12-01 | End: 2019-12-02

## 2019-12-01 RX ORDER — CALCIUM CARBONATE 200(500)MG
500 TABLET,CHEWABLE ORAL 3 TIMES DAILY PRN
Status: DISCONTINUED | OUTPATIENT
Start: 2019-12-01 | End: 2019-12-02

## 2019-12-01 RX ORDER — ONDANSETRON 8 MG/1
8 TABLET, ORALLY DISINTEGRATING ORAL EVERY 8 HOURS PRN
Status: DISCONTINUED | OUTPATIENT
Start: 2019-12-01 | End: 2019-12-02

## 2019-12-01 RX ORDER — SIMETHICONE 80 MG
1 TABLET,CHEWABLE ORAL 4 TIMES DAILY PRN
Status: DISCONTINUED | OUTPATIENT
Start: 2019-12-01 | End: 2019-12-02

## 2019-12-01 RX ORDER — SODIUM CHLORIDE 9 MG/ML
INJECTION, SOLUTION INTRAVENOUS CONTINUOUS
Status: DISCONTINUED | OUTPATIENT
Start: 2019-12-01 | End: 2019-12-02

## 2019-12-01 RX ORDER — SODIUM CHLORIDE, SODIUM LACTATE, POTASSIUM CHLORIDE, CALCIUM CHLORIDE 600; 310; 30; 20 MG/100ML; MG/100ML; MG/100ML; MG/100ML
INJECTION, SOLUTION INTRAVENOUS CONTINUOUS
Status: DISCONTINUED | OUTPATIENT
Start: 2019-12-01 | End: 2019-12-02

## 2019-12-01 RX ORDER — SODIUM CHLORIDE 9 MG/ML
INJECTION, SOLUTION INTRAVENOUS
Status: DISCONTINUED | OUTPATIENT
Start: 2019-12-01 | End: 2019-12-02

## 2019-12-01 RX ORDER — OXYTOCIN/RINGER'S LACTATE 30/500 ML
95 PLASTIC BAG, INJECTION (ML) INTRAVENOUS ONCE
Status: DISCONTINUED | OUTPATIENT
Start: 2019-12-01 | End: 2019-12-02

## 2019-12-01 RX ADMIN — ONDANSETRON 8 MG: 8 TABLET, ORALLY DISINTEGRATING ORAL at 11:12

## 2019-12-01 RX ADMIN — SODIUM CHLORIDE, SODIUM LACTATE, POTASSIUM CHLORIDE, AND CALCIUM CHLORIDE: .6; .31; .03; .02 INJECTION, SOLUTION INTRAVENOUS at 09:12

## 2019-12-02 PROBLEM — Z3A.37 PREGNANCY WITH 37 WEEKS COMPLETED GESTATION: Status: RESOLVED | Noted: 2019-12-01 | Resolved: 2019-12-02

## 2019-12-02 LAB
BASOPHILS # BLD AUTO: 0.02 K/UL (ref 0–0.2)
BASOPHILS NFR BLD: 0.1 % (ref 0–1.9)
DIFFERENTIAL METHOD: ABNORMAL
EOSINOPHIL # BLD AUTO: 0 K/UL (ref 0–0.5)
EOSINOPHIL NFR BLD: 0 % (ref 0–8)
ERYTHROCYTE [DISTWIDTH] IN BLOOD BY AUTOMATED COUNT: 13 % (ref 11.5–14.5)
HCT VFR BLD AUTO: 24.8 % (ref 37–48.5)
HGB BLD-MCNC: 8.1 G/DL (ref 12–16)
HIV 1+2 AB+HIV1 P24 AG SERPL QL IA: NEGATIVE
IMM GRANULOCYTES # BLD AUTO: 0.05 K/UL (ref 0–0.04)
IMM GRANULOCYTES NFR BLD AUTO: 0.3 % (ref 0–0.5)
LYMPHOCYTES # BLD AUTO: 0.8 K/UL (ref 1–4.8)
LYMPHOCYTES NFR BLD: 5.1 % (ref 18–48)
MCH RBC QN AUTO: 29 PG (ref 27–31)
MCHC RBC AUTO-ENTMCNC: 32.7 G/DL (ref 32–36)
MCV RBC AUTO: 89 FL (ref 82–98)
MONOCYTES # BLD AUTO: 0.5 K/UL (ref 0.3–1)
MONOCYTES NFR BLD: 3.1 % (ref 4–15)
NEUTROPHILS # BLD AUTO: 14.9 K/UL (ref 1.8–7.7)
NEUTROPHILS NFR BLD: 91.4 % (ref 38–73)
NRBC BLD-RTO: 0 /100 WBC
PLATELET # BLD AUTO: 219 K/UL (ref 150–350)
PMV BLD AUTO: 9.9 FL (ref 9.2–12.9)
RBC # BLD AUTO: 2.79 M/UL (ref 4–5.4)
RPR SER QL: NORMAL
WBC # BLD AUTO: 16.32 K/UL (ref 3.9–12.7)

## 2019-12-02 PROCEDURE — 25000003 PHARM REV CODE 250: Performed by: STUDENT IN AN ORGANIZED HEALTH CARE EDUCATION/TRAINING PROGRAM

## 2019-12-02 PROCEDURE — 72200005 HC VAGINAL DELIVERY LEVEL II

## 2019-12-02 PROCEDURE — 11000001 HC ACUTE MED/SURG PRIVATE ROOM

## 2019-12-02 PROCEDURE — 63600175 PHARM REV CODE 636 W HCPCS: Performed by: STUDENT IN AN ORGANIZED HEALTH CARE EDUCATION/TRAINING PROGRAM

## 2019-12-02 PROCEDURE — 59400 PR FULL ROUT OBSTE CARE,VAGINAL DELIV: ICD-10-PCS | Mod: ,,, | Performed by: OBSTETRICS & GYNECOLOGY

## 2019-12-02 PROCEDURE — 85025 COMPLETE CBC W/AUTO DIFF WBC: CPT

## 2019-12-02 PROCEDURE — 36415 COLL VENOUS BLD VENIPUNCTURE: CPT

## 2019-12-02 PROCEDURE — 59400 OBSTETRICAL CARE: CPT | Mod: ,,, | Performed by: OBSTETRICS & GYNECOLOGY

## 2019-12-02 PROCEDURE — 25000003 PHARM REV CODE 250

## 2019-12-02 PROCEDURE — 72100002 HC LABOR CARE, 1ST 8 HOURS

## 2019-12-02 PROCEDURE — 63600175 PHARM REV CODE 636 W HCPCS

## 2019-12-02 RX ORDER — OXYTOCIN/RINGER'S LACTATE 30/500 ML
95 PLASTIC BAG, INJECTION (ML) INTRAVENOUS ONCE
Status: DISCONTINUED | OUTPATIENT
Start: 2019-12-02 | End: 2019-12-02

## 2019-12-02 RX ORDER — IBUPROFEN 600 MG/1
600 TABLET ORAL EVERY 6 HOURS
Status: DISCONTINUED | OUTPATIENT
Start: 2019-12-02 | End: 2019-12-04 | Stop reason: HOSPADM

## 2019-12-02 RX ORDER — ACETAMINOPHEN 325 MG/1
650 TABLET ORAL EVERY 6 HOURS PRN
Status: DISCONTINUED | OUTPATIENT
Start: 2019-12-02 | End: 2019-12-04 | Stop reason: HOSPADM

## 2019-12-02 RX ORDER — SIMETHICONE 80 MG
1 TABLET,CHEWABLE ORAL EVERY 6 HOURS PRN
Status: DISCONTINUED | OUTPATIENT
Start: 2019-12-02 | End: 2019-12-04 | Stop reason: HOSPADM

## 2019-12-02 RX ORDER — LIDOCAINE HYDROCHLORIDE 10 MG/ML
INJECTION INFILTRATION; PERINEURAL
Status: COMPLETED
Start: 2019-12-02 | End: 2019-12-02

## 2019-12-02 RX ORDER — METHYLERGONOVINE MALEATE 0.2 MG/ML
INJECTION INTRAVENOUS
Status: COMPLETED
Start: 2019-12-02 | End: 2019-12-02

## 2019-12-02 RX ORDER — DIPHENOXYLATE HYDROCHLORIDE AND ATROPINE SULFATE 2.5; .025 MG/1; MG/1
1 TABLET ORAL 4 TIMES DAILY PRN
Status: DISCONTINUED | OUTPATIENT
Start: 2019-12-02 | End: 2019-12-04 | Stop reason: HOSPADM

## 2019-12-02 RX ORDER — ONDANSETRON 8 MG/1
8 TABLET, ORALLY DISINTEGRATING ORAL EVERY 8 HOURS PRN
Status: DISCONTINUED | OUTPATIENT
Start: 2019-12-02 | End: 2019-12-04 | Stop reason: HOSPADM

## 2019-12-02 RX ORDER — IBUPROFEN 600 MG/1
600 TABLET ORAL EVERY 6 HOURS PRN
Qty: 30 TABLET | Refills: 1 | Status: SHIPPED | OUTPATIENT
Start: 2019-12-02

## 2019-12-02 RX ORDER — PRENATAL WITH FERROUS FUM AND FOLIC ACID 3080; 920; 120; 400; 22; 1.84; 3; 20; 10; 1; 12; 200; 27; 25; 2 [IU]/1; [IU]/1; MG/1; [IU]/1; MG/1; MG/1; MG/1; MG/1; MG/1; MG/1; UG/1; MG/1; MG/1; MG/1; MG/1
1 TABLET ORAL DAILY
Status: DISCONTINUED | OUTPATIENT
Start: 2019-12-02 | End: 2019-12-04 | Stop reason: HOSPADM

## 2019-12-02 RX ORDER — CARBOPROST TROMETHAMINE 250 UG/ML
INJECTION, SOLUTION INTRAMUSCULAR
Status: DISCONTINUED
Start: 2019-12-02 | End: 2019-12-02 | Stop reason: WASHOUT

## 2019-12-02 RX ORDER — METHYLERGONOVINE MALEATE 0.2 MG/ML
200 INJECTION INTRAVENOUS
Status: DISCONTINUED | OUTPATIENT
Start: 2019-12-02 | End: 2019-12-04 | Stop reason: HOSPADM

## 2019-12-02 RX ORDER — DIPHENHYDRAMINE HCL 25 MG
25 CAPSULE ORAL EVERY 4 HOURS PRN
Status: DISCONTINUED | OUTPATIENT
Start: 2019-12-02 | End: 2019-12-04 | Stop reason: HOSPADM

## 2019-12-02 RX ORDER — HYDROCORTISONE 25 MG/G
CREAM TOPICAL 3 TIMES DAILY PRN
Status: DISCONTINUED | OUTPATIENT
Start: 2019-12-02 | End: 2019-12-04 | Stop reason: HOSPADM

## 2019-12-02 RX ORDER — CARBOPROST TROMETHAMINE 250 UG/ML
250 INJECTION, SOLUTION INTRAMUSCULAR
Status: DISCONTINUED | OUTPATIENT
Start: 2019-12-02 | End: 2019-12-04 | Stop reason: HOSPADM

## 2019-12-02 RX ORDER — MISOPROSTOL 200 UG/1
800 TABLET ORAL
Status: DISCONTINUED | OUTPATIENT
Start: 2019-12-02 | End: 2019-12-04 | Stop reason: HOSPADM

## 2019-12-02 RX ORDER — DIPHENHYDRAMINE HYDROCHLORIDE 50 MG/ML
25 INJECTION INTRAMUSCULAR; INTRAVENOUS EVERY 4 HOURS PRN
Status: DISCONTINUED | OUTPATIENT
Start: 2019-12-02 | End: 2019-12-04 | Stop reason: HOSPADM

## 2019-12-02 RX ORDER — PSEUDOEPHEDRINE HCL 30 MG
60 TABLET ORAL EVERY 6 HOURS PRN
Status: DISCONTINUED | OUTPATIENT
Start: 2019-12-02 | End: 2019-12-04 | Stop reason: HOSPADM

## 2019-12-02 RX ORDER — HYDROCODONE BITARTRATE AND ACETAMINOPHEN 10; 325 MG/1; MG/1
1 TABLET ORAL EVERY 4 HOURS PRN
Status: DISCONTINUED | OUTPATIENT
Start: 2019-12-02 | End: 2019-12-04 | Stop reason: HOSPADM

## 2019-12-02 RX ORDER — MISOPROSTOL 200 UG/1
TABLET ORAL
Status: DISPENSED
Start: 2019-12-02 | End: 2019-12-02

## 2019-12-02 RX ORDER — HYDROCODONE BITARTRATE AND ACETAMINOPHEN 5; 325 MG/1; MG/1
1 TABLET ORAL EVERY 4 HOURS PRN
Status: DISCONTINUED | OUTPATIENT
Start: 2019-12-02 | End: 2019-12-04 | Stop reason: HOSPADM

## 2019-12-02 RX ORDER — DOCUSATE SODIUM 100 MG/1
200 CAPSULE, LIQUID FILLED ORAL 2 TIMES DAILY PRN
Status: DISCONTINUED | OUTPATIENT
Start: 2019-12-02 | End: 2019-12-04 | Stop reason: HOSPADM

## 2019-12-02 RX ADMIN — PSEUDOEPHEDRINE HCL 60 MG: 30 TABLET, FILM COATED ORAL at 03:12

## 2019-12-02 RX ADMIN — Medication 334 MILLI-UNITS/MIN: at 03:12

## 2019-12-02 RX ADMIN — PSEUDOEPHEDRINE HCL 60 MG: 30 TABLET, FILM COATED ORAL at 09:12

## 2019-12-02 RX ADMIN — PRENATAL VIT W/ FE FUMARATE-FA TAB 27-0.8 MG 1 TABLET: 27-0.8 TAB at 09:12

## 2019-12-02 RX ADMIN — METHYLERGONOVINE MALEATE 200 MCG: 0.2 INJECTION, SOLUTION INTRAMUSCULAR; INTRAVENOUS at 03:12

## 2019-12-02 RX ADMIN — DOCUSATE SODIUM 200 MG: 100 CAPSULE, LIQUID FILLED ORAL at 09:12

## 2019-12-02 RX ADMIN — LIDOCAINE HYDROCHLORIDE 100 MG: 10 INJECTION, SOLUTION INFILTRATION; PERINEURAL at 03:12

## 2019-12-02 RX ADMIN — IBUPROFEN 600 MG: 600 TABLET, FILM COATED ORAL at 05:12

## 2019-12-02 RX ADMIN — IBUPROFEN 600 MG: 600 TABLET, FILM COATED ORAL at 12:12

## 2019-12-02 NOTE — H&P
HISTORY AND PHYSICAL                                                OBSTETRICS          Subjective:      Gabriela Lovelace is a 23 y.o. [id number] at 37w6d presents complaining of LOF and contractions. Patient states that she first noticed light yellow fluid in her underwear around 1915. She then took a shower and continued to slowly leak fluid afterwards. She states that she started ricky around 2pm and that her contractions are now q 3-4 mins. She denies vaginal bleeding. She otherwise has no complaints. This IUP is uncomplicated.  Fetal movement normal.    In the TRINA, patient was found to be grossly ruptured, ricky every 3 minutes, and 5cm dilated. She was admitted to L&D for normal labor.      Review of Systems   Constitutional: Negative for appetite change, chills, fatigue and fever.   HENT: Negative for congestion.    Eyes: Negative for visual disturbance.   Respiratory: Negative for shortness of breath.    Cardiovascular: Negative for chest pain and palpitations.   Gastrointestinal: Positive for abdominal pain. Negative for diarrhea, nausea and vomiting.   Genitourinary: Positive for vaginal discharge. Negative for dysuria, pelvic pain and vaginal bleeding.   Musculoskeletal: Positive for back pain.   Neurological: Negative for headaches.   Psychiatric/Behavioral: Negative for agitation.     PMHx:   Past Medical History:   Diagnosis Date    Left wrist pain 2018       PSHx:   Past Surgical History:   Procedure Laterality Date    DILATION AND CURETTAGE OF UTERUS      OPEN REDUCTION AND INTERNAL FIXATION (ORIF) OF INJURY OF WRIST Left 6/15/2018    Procedure: ORIF, WRIST - LEFT SCAPHOID;  Surgeon: Marixa Bridges MD;  [facility]: [facility];  Service: Orthopedics;  Laterality: Left;  Stretcher; Supine; K wire ; Drill; Mini C-arm; Accumed    WISDOM TOOTH EXTRACTION         All: Review of patient's allergies indicates:  No Known Allergies    Meds:   No medications prior to  admission.       SH:   Social History     Socioeconomic History    Marital status:      Spouse name: Not on file    Number of children: Not on file    Years of education: Not on file    Highest education level: Not on file   Occupational History    Not on file   Social Needs    Financial resource strain: Not on file    Food insecurity:     Worry: Not on file     Inability: Not on file    Transportation needs:     Medical: Not on file     Non-medical: Not on file   Tobacco Use    Smoking status: Never Smoker    Smokeless tobacco: Never Used   Substance and Sexual Activity    Alcohol use: Not Currently    Drug use: Not Currently    Sexual activity: Yes     Partners: Male     Birth control/protection: None   Lifestyle    Physical activity:     Days per week: Not on file     Minutes per session: Not on file    Stress: Not on file   Relationships    Social connections:     Talks on phone: Not on file     Gets together: Not on file     Attends [employer] service: Not on file     Active member of club or organization: Not on file     Attends meetings of clubs or organizations: Not on file     Relationship status: Not on file   Other Topics Concern    Not on file   Social History Narrative    Not on file       FH:   Family History   Problem Relation Age of Onset    Asthma Brother     Breast cancer Neg Hx     Colon cancer Neg Hx     Ovarian cancer Neg Hx        OBHx:   OB History    Para Term  AB Living   2 0 0 0 1 0   SAB TAB Ectopic Multiple Live Births   1 0 0 0 0      # Outcome Date GA Lbr Mihir/2nd Weight Sex Delivery Anes PTL Lv   2 Current            1 2014     SAB   FD       Objective:       /75   Pulse 94   Temp 97 °F (36.1 °C)   Resp 18   Ht 5' (1.524 m)   Wt 67 kg (147 lb 11.3 oz)   LMP 2019 (Exact Date)   SpO2 98%   Breastfeeding? No   BMI 28.85 kg/m²     Vitals:    19 2111 19   BP:  120/75    Pulse:  86 94   Resp:  18     Temp:  97 °F (36.1 °C)    SpO2:   98%   Weight: 67 kg (147 lb 11.3 oz)     Height: 5' (1.524 m)         General:   alert, appears stated age and cooperative, no apparent distress   HENT:  normocephalic, atraumatic   Eyes:  extraocular movements and conjunctivae normal   Neck:  supple, range of motion normal, no thyromegaly   Lungs:   no respiratory distress   Heart:   regular rate   Abdomen:  soft, non-tender, non-distended but gravid, no rebound or guarding    Extremities negative edema, negative erythema   FHT: 155, moderate BTBV, -accels, -decels;  Cat 1 (reassuring)                 TOCO: Q 3 minutes   Presentations: cephalic by ultrasound   Cervix:     Dilation: 5cm    Effacement: 80%    Station:  -2    Consistency: soft    Position: anterior   Sterile Speculum Exam: pos pooling and nitrazine. + vag bleeding after insertion of the speculum    Lab Review  Blood Type A POS  GBBS: negative  Rubella: Immune  RPR: NR  HIV: negative  HepB: negative       Assessment:       37w6d weeks gestation admitted to L&D for normal labor.    Active Hospital Problems    Diagnosis  POA    *Normal labor and delivery [O80]  Not Applicable      Resolved Hospital Problems   No resolved problems to display.          Plan:   1. Normal labor:  - Risks, benefits, alternatives and possible complications have been discussed in detail with the patient.   - Consents signed and to chart  - Admit to Labor and Delivery unit  - SROM at 1915  - Fetus cephalic on bedside ultrasound   - Epidural per Anesthesia  - Draw CBC, T&C  - Notified Dr. Wagner  - Recheck in 2 hrs or PRN  - Post-Partum Hemorrhage risk - low    Helene Figueroa MD  OBGYN, PGY-2

## 2019-12-02 NOTE — H&P
HISTORY AND PHYSICAL                                                OBSTETRICS          Subjective:       Gabriela Lovelace is a 23 y.o.  female with IUP at 37w6d weeks gestation admitted from [facility] in labor at term. On arrival, she reported SROM @ 1910, 2h prior to presentation.    Patient reports contractions, denies vaginal bleeding, reports LOF.   Fetal Movement: normal.    This IUP is uncomplicated:      Review of Systems   Constitutional: Negative for activity change, chills and fatigue.   Eyes: Negative for visual disturbance.   Respiratory: Negative for shortness of breath.    Cardiovascular: Negative for chest pain and palpitations.   Gastrointestinal: Negative for abdominal pain, constipation, diarrhea, nausea and vomiting.   Genitourinary: Positive for vaginal discharge (ROM @ 1910). Negative for dysuria, vaginal bleeding, vaginal pain and vaginal odor.   Musculoskeletal: Negative for myalgias.   Integumentary:  Negative for rash.   Neurological: Negative for headaches.   Psychiatric/Behavioral: Negative for depression.        PMHx:   Past Medical History:   Diagnosis Date    Left wrist pain 2018       PSHx:   Past Surgical History:   Procedure Laterality Date    DILATION AND CURETTAGE OF UTERUS      OPEN REDUCTION AND INTERNAL FIXATION (ORIF) OF INJURY OF WRIST Left 6/15/2018    Procedure: ORIF, WRIST - LEFT SCAPHOID;  Surgeon: Marixa Bridges MD;  [facility];  Service: Orthopedics;  Laterality: Left;  Stretcher; Supine; K wire ; Drill; Mini C-arm; Accumed    WISDOM TOOTH EXTRACTION         All: Review of patient's allergies indicates:  No Known Allergies    Meds:   (Not in a hospital admission)    SH:   Social History     Socioeconomic History    Marital status:      Spouse name: Not on file    Number of children: Not on file    Years of education: Not on file    Highest education level: Not on file   Occupational History    Not on file   Social  Needs    Financial resource strain: Not on file    Food insecurity:     Worry: Not on file     Inability: Not on file    Transportation needs:     Medical: Not on file     Non-medical: Not on file   Tobacco Use    Smoking status: Never Smoker    Smokeless tobacco: Never Used   Substance and Sexual Activity    Alcohol use: Not Currently    Drug use: Not Currently    Sexual activity: Yes     Partners: Male     Birth control/protection: None   Lifestyle    Physical activity:     Days per week: Not on file     Minutes per session: Not on file    Stress: Not on file   Relationships    Social connections:     Talks on phone: Not on file     Gets together: Not on file     Attends Oriental orthodox service: Not on file     Active member of club or organization: Not on file     Attends meetings of clubs or organizations: Not on file     Relationship status: Not on file   Other Topics Concern    Not on file   Social History Narrative    Not on file       FH:   Family History   Problem Relation Age of Onset    Asthma Brother     Breast cancer Neg Hx     Colon cancer Neg Hx     Ovarian cancer Neg Hx        OBHx:   OB History    Para Term  AB Living   2 0 0 0 1 0   SAB TAB Ectopic Multiple Live Births   1 0 0 0 0      # Outcome Date GA Lbr Mihir/2nd Weight Sex Delivery Anes PTL Lv   2 Current            1 2014     SAB   FD       Objective:       Wt 67 kg (147 lb 11.3 oz)   LMP 2019 (Exact Date)   BMI 28.85 kg/m²     Vitals:    19 2111   Weight: 67 kg (147 lb 11.3 oz)       General:   alert, appears stated age and cooperative, no apparent distress   HENT:  normocephalic, atraumatic   Eyes:  extraocular movements and conjunctivae normal   Neck:  supple, range of motion normal, no thyromegaly   Lungs:   no respiratory distress   Heart:   regular rate   Abdomen:  soft, non-tender, non-distended but gravid, no rebound or guarding    Extremities negative edema, negative erythema   FHT: 155 bpm,  moderate BTBV, +accels, -decels;  Cat 1 (reassuring)                 TOCO: Q 3 minutes   Presentations: cephalic by ultrasound   Cervix:     Dilation: 5    Effacement: 80    Station:  -2    Consistency: soft    Position: middle   Sterile Speculum Exam: + pooling    Lab Review  Blood Type A POS  GBBS: negative  Rubella: Immune  RPR: NR in T1, T3 lab ordered  HIV: NR in T1, T3 lab ordered  HepB: negative       Assessment:       37w6d weeks gestation in labor at term, s/p SROM 1910    Active Hospital Problems    Diagnosis  POA    Pregnancy with 37 weeks completed gestation [Z3A.37]  Not Applicable    Normal labor and delivery [O80]  Not Applicable      Resolved Hospital Problems   No resolved problems to display.      Plan:     Normal labor and delivery:  - Risks, benefits, alternatives and possible complications have been discussed in detail with the patient.   - Consents signed and to chart  - Admit to Labor and Delivery unit  - Pitocin augmentation per protocol as indicated   - Epidural per Anesthesia  - Draw CBC, T&S  - Notify Staff  - Recheck in 2 hrs or PRN    Post-Partum Hemorrhage risk - low    Catia Camacho MD, PhD  OBGYN, PGY-4

## 2019-12-02 NOTE — PROGRESS NOTES
LABOR PROGRESS NOTE    S:  MD to bedside for cervical exam. Complaints: Yes - Patient called out reporting feeling rectal pressure.      O: Temp:  [97 °F (36.1 °C)] 97 °F (36.1 °C)  Pulse:  [86-94] 94  Resp:  [18] 18  SpO2:  [98 %] 98 %  BP: (120)/(75) 120/75    FHT: 155 BPM/moderate beat to beat variability/+accels/-decels Cat 1 (reassuring)  CTX: q 2-3 minutes, without augmentation  SVE: 0    ASSESSMENT:   23 y.o.  at 37w6d, in labor at term    FHT reassuring    Active Hospital Problems    Diagnosis  POA    *Normal labor and delivery [O80]  Not Applicable      Resolved Hospital Problems   No resolved problems to display.     PLAN:  Labor course:   : /-2  2300:     Labor management  Continue Close Maternal/Fetal Monitoring.   Pitocin Augmentation per protocol, if needed  Recheck 2 hours or PRN    Catia Camacho MD

## 2019-12-02 NOTE — PLAN OF CARE
Attempted to see pt to complete consult. Pt had visitors and it was almost quiet time. Sw will attempt again at a later time.     Beverly Ortiz LCSW    [facility]  [email or web address]    (phone) [phone] or  [address] [phone]  (fax) [phone]

## 2019-12-02 NOTE — L&D DELIVERY NOTE
[facility]  Vaginal Delivery   Operative Note    SUMMARY     Normal spontaneous vaginal delivery of live infant, was placed on mothers abdomen for skin to skin and bulb suctioning performed.  Infant delivered position OA over intact perineum.  Nuchal cord: No. Terminal meconium was noted.    Spontaneous delivery of placenta and IV pitocin given noting uterine atony with bleeding. IM methergine was administered and good tone was then noted.  6cc of lidocaine were administered around a right periurethral laceration. This was repaired with interrupted sutures of 2-0 chromic. A smaller left periurethral laceration was also noted but this was hemostatic and did not require repair.  Patient tolerated delivery well. Sponge needle and lap counted correctly x2.    Helene Figueroa MD  OBGYN, PGY-2    Indications:  (spontaneous vaginal delivery)  Pregnancy complicated by:   Patient Active Problem List   Diagnosis    Weakness of wrist     (spontaneous vaginal delivery)    Normal labor and delivery     Admitting GA: 38w0d    Delivery Information for Merrill Lovelace    Birth information:  YOB: 2021   Time of birth: 3:38 AM   Sex: female   Head Delivery Date/Time: 2019  3:38 AM   Delivery type: Vaginal, Spontaneous   Gestational Age: 38w0d    Delivery Providers    Delivering clinician:             Measurements    Weight:  2948 g  Length:           Apgars    Living status:  Living  Apgars:   1 min.:   5 min.:   10 min.:   15 min.:   20 min.:     Skin color:   1  1       Heart rate:   2  2       Reflex irritability:   2  2       Muscle tone:   1  2       Respiratory effort:   1  2       Total:   7  9       Apgars assigned by:  CALEB FOSTER         Operative Delivery    Forceps attempted?:  No  Vacuum extractor attempted?:  No         Shoulder Dystocia    Shoulder dystocia present?:  No           Presentation    Presentation:  Vertex  Position:  Left Occiput Anterior            Interventions/Resuscitation         Cord    No data filed        Placenta    Placenta delivery date/time:    Placenta removal:             Labor Events:       labor: No     Labor Onset Date/Time:         Dilation Complete Date/Time: 2019 03:10     Start Pushing Date/Time: 2019 03:15       Start Pushing Date/Time: 2019 03:15     Rupture Date/Time: 19  1845         Rupture type:           Fluid Amount:        Fluid Color: Clear      Fluid Odor:        Membrane Status: SRM (Spontaneous Rupture)               steroids: None     Antibiotics given for GBS: No     Induction: none     Indications for induction:        Augmentation:       Indications for augmentation:       Labor complications: None     Additional complications:          Cervical ripening:                     Delivery:      Episiotomy: None     Indication for Episiotomy:       Perineal Lacerations: None Repaired:      Periurethral Laceration: right Repaired: Yes   Labial Laceration:   Repaired:     Sulcus Laceration:   Repaired:     Vaginal Laceration:   Repaired:     Cervical Laceration:   Repaired:     Repair suture:       Repair # of packets:       Last Value - EBL - Nursing (mL):       Sum - EBL - Nursing (mL): 0     Last Value - EBL - Anesthesia (mL):      Calculated QBL (mL):        Vaginal Sweep Performed:       [facility] Correct:         Other providers:       Anesthesia    Method:  Other          Details (if applicable):  Trial of Labor      Categorization:      Priority:     Indications for :     Incision Type:       Additional  information:  Forceps:    Vacuum:    Breech:    Observed anomalies    Other (Comments):

## 2019-12-02 NOTE — ANESTHESIA PREPROCEDURE EVALUATION
[facility]  Anesthesia Pre-Operative Evaluation       Patient Name: Gabriela Lovelace  YOB: 1995  MRN: [id number]    SUBJECTIVE:     Gabriela Lovelace is a 23 y.o. female [id number] at 37w6d presents complaining of LOF and contractions.      Denies previous issues with neuraxial anesthesia.  Denies previous issues with general anesthesia.  Denies family history of issues with anesthesia.    Denies hx of seizures, strokes, HTN, heart failure, smoking, asthma, COPD, acid reflux, liver disease, kidney disease, bleeding disorders, taking blood thinners, back surgery.  Lab Results   Component Value Date     2019       OB History    Para Term  AB Living   2       1     SAB TAB Ectopic Multiple Live Births   1              # Outcome Date GA Lbr Mihir/2nd Weight Sex Delivery Anes PTL Lv   2 Current            1 2014     SAB   FD       Past Surgical History:   Procedure Laterality Date    DILATION AND CURETTAGE OF UTERUS      OPEN REDUCTION AND INTERNAL FIXATION (ORIF) OF INJURY OF WRIST Left 6/15/2018    Procedure: ORIF, WRIST - LEFT SCAPHOID;  Surgeon: Marixa Bridges MD;  Location: [facility];  Service: Orthopedics;  Laterality: Left;  Stretcher; Supine; K wire ; Drill; Mini C-arm; Accumed    WISDOM TOOTH EXTRACTION          Patient Active Problem List   Diagnosis    Weakness of wrist    Pregnancy with 37 weeks completed gestation    Normal labor and delivery       Review of patient's allergies indicates:  No Known Allergies    Social History     Socioeconomic History    Marital status:      Spouse name: Not on file    Number of children: Not on file    Years of education: Not on file    Highest education level: Not on file   Occupational History    Not on file   Social Needs    Financial resource strain: Not on file    Food insecurity:     Worry: Not on file     Inability: Not on file    Transportation needs:     Medical: Not on  file     Non-medical: Not on file   Tobacco Use    Smoking status: Never Smoker    Smokeless tobacco: Never Used   Substance and Sexual Activity    Alcohol use: Not Currently    Drug use: Not Currently    Sexual activity: Yes     Partners: Male     Birth control/protection: None   Lifestyle    Physical activity:     Days per week: Not on file     Minutes per session: Not on file    Stress: Not on file   Relationships    Social connections:     Talks on phone: Not on file     Gets together: Not on file     Attends Zoroastrian service: Not on file     Active member of club or organization: Not on file     Attends meetings of clubs or organizations: Not on file     Relationship status: Not on file   Other Topics Concern    Not on file   Social History Narrative    Not on file       OBJECTIVE:     Outpatient Medications:  No current facility-administered medications on file prior to encounter.      No current outpatient medications on file prior to encounter.        Current Inpatient Medications:   oxytocin in lactated ringers  334 ignacia-units/min Intravenous Once    oxytocin in lactated ringers  95 ignacia-units/min Intravenous Once       Wt Readings from Last 1 Encounters:   12/01/19 2111 67 kg (147 lb 11.3 oz)       BP Readings from Last 3 Encounters:   12/01/19 120/75   11/25/19 118/60   11/05/19 102/60         Chemistry    No results found for: NA, K, CL, CO2, BUN, CREATININE, GLU No results found for: CALCIUM, ALKPHOS, AST, ALT, BILITOT, ESTGFRAFRICA, EGFRNONAA         Lab Results   Component Value Date    WBC 14.59 (H) 12/01/2019    HGB 12.5 12/01/2019    HCT 38.0 12/01/2019    MCV 88 12/01/2019     12/01/2019       No results for input(s): PT, INR, PROTIME, APTT in the last 72 hours.      Anesthesia Evaluation    I have reviewed the Patient Summary Reports.    I have reviewed the Nursing Notes.      Review of Systems  Anesthesia Hx:  Denies Family Hx of Anesthesia complications.   Denies Personal  Hx of Anesthesia complications.   Hematology/Oncology:  Hematology Normal   Oncology Normal     EENT/Dental:EENT/Dental Normal   Cardiovascular:  Cardiovascular Normal     Pulmonary:  Pulmonary Normal    Renal/:  Renal/ Normal     Hepatic/GI:  Hepatic/GI Normal    Musculoskeletal:  Musculoskeletal Normal    Neurological:  Neurology Normal    Endocrine:  Endocrine Normal    Dermatological:  Skin Normal    Psych:  Psychiatric Normal           Physical Exam  General:  Well nourished    Airway/Jaw/Neck:  Airway Findings: Mouth Opening: Normal Tongue: Normal  General Airway Assessment: Adult  Mallampati: I  TM Distance: Normal, at least 6 cm  Jaw/Neck Findings:  Neck ROM: Normal ROM  Neck Findings: Normal    Eyes/Ears/Nose:  EYES/EARS/NOSE FINDINGS: Normal   Dental:  Dental Findings: In tact   Chest/Lungs:  Chest/Lungs Clear    Heart/Vascular:  Heart Findings: Normal       Mental Status:  Mental Status Findings: Normal        Anesthesia Plan  Type of Anesthesia, risks & benefits discussed:  Anesthesia Type:  general, CSE, epidural, spinal  Patient's Preference:   Intra-op Monitoring Plan: standard ASA monitors  Intra-op Monitoring Plan Comments:   Post Op Pain Control Plan: multimodal analgesia, IV/PO Opioids PRN and per primary service following discharge from PACU  Post Op Pain Control Plan Comments:   Induction:   IV  Beta Blocker:  Patient is not currently on a Beta-Blocker (No further documentation required).       Informed Consent: Patient understands risks and agrees with Anesthesia plan.  Questions answered. Anesthesia consent signed with patient.  ASA Score: 2     Day of Surgery Review of History & Physical:    H&P update referred to the surgeon.         Ready For Surgery From Anesthesia Perspective.

## 2019-12-02 NOTE — PROGRESS NOTES
LABOR PROGRESS NOTE    S:  MD to bedside for cervical exam. Complaints: Yes - Patient called out reporting feeling rectal pressure. Getting some relief with nitrous.       O: Temp:  [97 °F (36.1 °C)] 97 °F (36.1 °C)  Pulse:  [86-99] 99  Resp:  [18] 18  SpO2:  [98 %] 98 %  BP: (120-122)/(65-75) 122/65    FHT: 150 BPM/moderate beat to beat variability/+accels/+ variable decels, Cat 1 (reassuring)  CTX: q 3 minutes, without augmentation  SVE:  with bulging forebag    ASSESSMENT:   23 y.o.  at 37w6d, in labor at term    FHT reassuring    Active Hospital Problems    Diagnosis  POA    *Normal labor and delivery [O80]  Not Applicable      Resolved Hospital Problems   No resolved problems to display.     PLAN:  Labor course:   2130: /-2  2300: 0  0030: 0  0230:     Labor management  Continue Close Maternal/Fetal Monitoring.   Using nitrous for pain control  Recheck 2 hours or PRN    Helene Figueora MD  OBGYN, PGY-2

## 2019-12-02 NOTE — PROGRESS NOTES
LABOR PROGRESS NOTE    S:  MD to bedside for cervical exam. Complaints: Yes - Patient called out reporting feeling rectal pressure.      O: Temp:  [97 °F (36.1 °C)] 97 °F (36.1 °C)  Pulse:  [86-99] 99  Resp:  [18] 18  SpO2:  [98 %] 98 %  BP: (120-122)/(65-75) 122/65    FHT: 150 BPM/moderate beat to beat variability/+accels/-decels Cat 1 (reassuring)  CTX: q 2-3 minutes, without augmentation  SVE: /0 with bulging forebag    ASSESSMENT:   23 y.o.  at 37w6d, in labor at term    FHT reassuring    Active Hospital Problems    Diagnosis  POA    *Normal labor and delivery [O80]  Not Applicable      Resolved Hospital Problems   No resolved problems to display.     PLAN:  Labor course:   2130: /-2  2300: /0  0030: /0    Labor management  Continue Close Maternal/Fetal Monitoring.   Making cervical change without augmentation  May now begin using nitrous for pain  Recheck 2 hours or PRN    Helene Figueroa MD  OBGYN, PGY-2

## 2019-12-02 NOTE — LACTATION NOTE
12/02/19 1145   Maternal Assessment   Breast Shape Bilateral:;round   Breast Density Bilateral:;soft   Areola Bilateral:;elastic   Nipples everted   Maternal Infant Feeding   Maternal Emotional State assist needed   Infant Positioning cross-cradle   Latch Assistance yes  (Latched, not sucking)   Pt agreeable to assistance with latching. Baby engaged in a brief suck, but released the breast. Hand expression used and ebm provided by spoon and finger. Baby tongue thrusting and biting down. LC described the behavior and Pt confirmed similar experience when baby is at the breast.  Lactation Basic education completed. LC reviewed Breastfeeding Guide including substances passing through breastmilk and encouraged tracking feeds and output. Encouraged use of STS, frequent feeds on cue, and avoiding artificial nipples.  Pt verbalized understanding and questions answered. Pt aware to call LC for assistance with feeding. LC name and number placed on board.

## 2019-12-02 NOTE — ED PROVIDER NOTES
Encounter Date: 2019       History     Chief Complaint   Patient presents with    Rupture of Membranes    Contractions     Gabriela Lovelace is a 23 y.o. [id number] at 37w6d presents complaining of LOF and contractions. Patient states that she first noticed light yellow fluid in her underwear around 1915. She then took a shower and continued to slowly leak fluid afterwards. She states that she started ricky around 2pm and that her contractions are now q 3-4 mins. She denies vaginal bleeding. She otherwise has no complaints. This IUP is uncomplicated.  Fetal movement normal.          Review of patient's allergies indicates:  No Known Allergies  Past Medical History:   Diagnosis Date    Left wrist pain 2018     Past Surgical History:   Procedure Laterality Date    DILATION AND CURETTAGE OF UTERUS      OPEN REDUCTION AND INTERNAL FIXATION (ORIF) OF INJURY OF WRIST Left 6/15/2018    Procedure: ORIF, WRIST - LEFT SCAPHOID;  Surgeon: Marixa Bridges MD;  Location: [facility];  Service: Orthopedics;  Laterality: Left;  Stretcher; Supine; K wire ; Drill; Mini C-arm; Accumed    WISDOM TOOTH EXTRACTION       Family History   Problem Relation Age of Onset    Asthma Brother     Breast cancer Neg Hx     Colon cancer Neg Hx     Ovarian cancer Neg Hx      Social History     Tobacco Use    Smoking status: Never Smoker    Smokeless tobacco: Never Used   Substance Use Topics    Alcohol use: Not Currently    Drug use: Not Currently     Review of Systems   Constitutional: Negative for appetite change, chills, fatigue and fever.   HENT: Negative for congestion.    Eyes: Negative for visual disturbance.   Respiratory: Negative for shortness of breath.    Cardiovascular: Negative for chest pain and palpitations.   Gastrointestinal: Positive for abdominal pain. Negative for diarrhea, nausea and vomiting.   Genitourinary: Positive for vaginal discharge. Negative for dysuria, pelvic pain and  vaginal bleeding.   Musculoskeletal: Positive for back pain.   Neurological: Negative for headaches.   Psychiatric/Behavioral: Negative for agitation.       Physical Exam     Initial Vitals   BP Pulse Resp Temp SpO2   12/01/19 2131 12/01/19 2131 12/01/19 2131 12/01/19 2131 12/01/19 2132   120/75 86 18 97 °F (36.1 °C) 98 %      MAP       --                Physical Exam    Vitals reviewed.  Constitutional: She appears well-developed and well-nourished. No distress.   HENT:   Head: Normocephalic and atraumatic.   Cardiovascular: Normal rate, regular rhythm and normal heart sounds.   Pulmonary/Chest: No respiratory distress.   Abdominal: Soft. There is no tenderness. There is no rebound and no guarding.   Musculoskeletal: Normal range of motion. She exhibits no edema or tenderness.   Neurological: She is alert and oriented to person, place, and time.   Skin: Skin is warm and dry.   Psychiatric: She has a normal mood and affect. Her behavior is normal. Judgment and thought content normal.     OB LABOR EXAM:       Method: Sterile speculum exam per MD and Sterile vaginal exam per MD.   Vaginal Bleeding: bloody show.   Engagement: engaged.   Dilatation: 5.   Station: -2.   Effacement: 80%.   Amniotic Fluid Color: clear.   Amniotic Fluid Amount: moderate.         ED Course   Obtain Fetal nonstress test (NST)  Date/Time: 12/1/2019 9:25 PM  Performed by: Helene Figueroa MD  Authorized by: Helene Figueroa MD     Nonstress Test:     Variability:  6-25 BPM    Decelerations:  None    Accelerations:  Absent    Baseline:  155    Contractions:  Regular    Contraction Frequency:  Q 3 mins  Biophysical Profile:     Overall Impression:  Reassuring      Labs Reviewed   HIV 1 / 2 ANTIBODY   TYPE AND SCREEN LABOR & DELIVERY          Imaging Results    None          Medical Decision Making:   ED Management:  - VSS  - FHTs baseline 155, - accels, - decels, Cat 1  - CTX q 3 mins  - SSE: pos pooling and nitrazine. + vag bleeding  after insertion of the speculum  - SVE: 5/80/-2  - Fetus cephalic on bedside ultrasound  - Admit to L&D for normal labor  - Consents reviewed and signed  - Fetus cephalic on bedside ultrasound                                 Clinical Impression:       ICD-10-CM ICD-9-CM   1. Normal labor and delivery O80 650   2. Pregnancy with 37 weeks completed gestation Z3A.37 V22.2                             Helene Figueroa MD  Resident  12/01/19 4235

## 2019-12-03 PROCEDURE — 25000003 PHARM REV CODE 250: Performed by: STUDENT IN AN ORGANIZED HEALTH CARE EDUCATION/TRAINING PROGRAM

## 2019-12-03 PROCEDURE — 11000001 HC ACUTE MED/SURG PRIVATE ROOM

## 2019-12-03 RX ORDER — FERROUS SULFATE 325(65) MG
325 TABLET ORAL DAILY
Qty: 30 TABLET | Refills: 0 | Status: SHIPPED | OUTPATIENT
Start: 2019-12-03

## 2019-12-03 RX ORDER — FERROUS SULFATE 325(65) MG
325 TABLET, DELAYED RELEASE (ENTERIC COATED) ORAL DAILY
Status: DISCONTINUED | OUTPATIENT
Start: 2019-12-03 | End: 2019-12-04 | Stop reason: HOSPADM

## 2019-12-03 RX ADMIN — DOCUSATE SODIUM 200 MG: 100 CAPSULE, LIQUID FILLED ORAL at 09:12

## 2019-12-03 RX ADMIN — FERROUS SULFATE TAB EC 325 MG (65 MG FE EQUIVALENT) 325 MG: 325 (65 FE) TABLET DELAYED RESPONSE at 08:12

## 2019-12-03 RX ADMIN — IBUPROFEN 600 MG: 600 TABLET, FILM COATED ORAL at 12:12

## 2019-12-03 RX ADMIN — IBUPROFEN 600 MG: 600 TABLET, FILM COATED ORAL at 06:12

## 2019-12-03 RX ADMIN — IBUPROFEN 600 MG: 600 TABLET, FILM COATED ORAL at 11:12

## 2019-12-03 RX ADMIN — PRENATAL VIT W/ FE FUMARATE-FA TAB 27-0.8 MG 1 TABLET: 27-0.8 TAB at 08:12

## 2019-12-03 RX ADMIN — IBUPROFEN 600 MG: 600 TABLET, FILM COATED ORAL at 05:12

## 2019-12-03 RX ADMIN — PSEUDOEPHEDRINE HCL 60 MG: 30 TABLET, FILM COATED ORAL at 05:12

## 2019-12-03 NOTE — DISCHARGE INSTRUCTIONS
Breastfeeding Discharge Instructions       Feed the baby at the earliest sign of hunger or comfort  o Hands to mouth, sucking motions  o Rooting or searching for something to suck on  o Dont wait for crying - it is a sign of distress     The feedings may be 8-12 times per 24hrs and will not follow a schedule   Avoid pacifiers and bottles for the first 4 weeks   Alternate the breast you start the feeding with, or start with the breast that feels the fullest   Switch breasts when the baby takes himself off the breast or falls asleep   Keep offering breasts until the baby looks full, no longer gives hunger signs, and stays asleep when placed on his back in the crib   If the baby is sleepy and wont wake for a feeding, put the baby skin-to-skin dressed in a diaper against the mothers bare chest   Sleep near your baby   The baby should be positioned and latched on to the breast correctly  o Chest-to-chest, chin in the breast  o Babys lips are flipped outward  o Babys mouth is stretched open wide like a shout  o Babys sucking should feel like tugging to the mother  - The baby should be drinking at the breast:  o You should hear swallowing or gulping throughout the feeding  o You should see milk on the babys lips when he comes off the breast  o Your breasts should be softer when the baby is finished feeding  o The baby should look relaxed at the end of feedings  o After the 4th day and your milk is in:  o The babys poop should turn bright yellow and be loose, watery, and seedy  o The baby should have at least 3-4 poops the size of the palm of your hand per day  o The baby should have at least 5-6 wet diapers per day  o The urine should be light yellow in color  You should drink when you are thirsty and eat a healthy diet when you are    hungry.     Take naps to get the rest you need.   Take medications and/or drink alcohol only with permission of your obstetrician    or the babys pediatrician.  You can  also call the [facility] Risk Center,   ([phone]), Monday-Friday, 8am-5pm Central time, to get the most   up-to-date evidence-based information on the use of medications during   pregnancy and breastfeeding.      The baby should be examined by a pediatrician at 3-5 days of age.  Once your   milk comes in, the baby should be gaining at least ½ - 1oz each day and should be back to birthweight no later than 10-14 days of age.          Community Resources    [facility] Breastfeeding Warmline: [phone]   Local [facility] clinics: provide incentives and breastpumps to eligible mothers  [employer] (LLLI):  mother-to-mother support group website        www.LOG607l.Elixent  Local La Leche League mother-to-mother support groups:        www.DooBop        La Leche League [facility]   Dr. Levon Turner website for latch videos and general information:        www.breastfeedinginc.ca  [facility] is a call center that provides information about the safety of taking medications while breastfeeding.  Call 1-526.552.8544, M-F, 8am-5pm, [place].  International Lactation Consultant Association provides resources for assistance:        www.ilca.org  [place] Breastfeeding Coalition provides informationand resources for parents  and the community    www.LaBreastfeedingSupport.org     Portia Mao is a mom-to-mom support group:                             www.[employer].com//breastfeedng-support/  [employer] for Healthy Babies:  2-813-685-BABY(7855)  [employer]: a breastfeeding support group for women of color, 394.766.1104

## 2019-12-03 NOTE — LACTATION NOTE
12/03/19 1030   Maternal Assessment   Breast Shape Bilateral:;wide   Breast Density Bilateral:;soft   Areola Bilateral:;elastic   Nipples Bilateral:;everted   Maternal Infant Feeding   Maternal Emotional State assist needed   Infant Positioning cross-cradle   Signs of Milk Transfer audible swallow   Latch Assistance yes   Lactation Referrals   Lactation Referrals outpatient lactation program   assisted pt with cross cradle position. Baby latched to breast after a few attempts. Pt shown how to keep baby actively nursing during feeding. Discharge lactation education provided. Questions answered. Pt has lactation contact number.

## 2019-12-03 NOTE — PROGRESS NOTES
POSTPARTUM PROGRESS NOTE     Gabriela Lovelace is a 23 y.o. female PPD #1 status post Spontaneous vaginal delivery at 38w0d in a pregnancy complicated by periurethral laceration during delivery. Patient is doing well this morning. She denies nausea, vomiting, fever or chills.  Patient reports mild abdominal pain that is adequately relieved by oral pain medications. Lochia is mild to moderate  and stable. Patient is voiding without difficulty and ambulating with no difficulty. She has passed flatus, and has not had BM.  Patient does plan to breast feed. Per Dr. Wagner/Jeansonne for contraception.    Objective:       Temp:  [98.1 °F (36.7 °C)-98.7 °F (37.1 °C)] 98.1 °F (36.7 °C)  Pulse:  [] 99  Resp:  [18] 18  SpO2:  [97 %-98 %] 97 %  BP: (102-108)/(57-63) 102/57    General:   alert, appears stated age and cooperative   Lungs:   clear to auscultation bilaterally   Heart:   regular rate and rhythm, S1, S2 normal, no murmur, click, rub or gallop   Abdomen:  soft, non-tender; bowel sounds normal; no masses,  no organomegaly   Uterus:  firm located at the umblicus.    Extremities: peripheral pulses normal, no pedal edema, no clubbing or cyanosis     Lab Review  No results found for this or any previous visit (from the past 4 hour(s)).    I/O    Intake/Output Summary (Last 24 hours) at 12/3/2019 0642  Last data filed at 2019 1100  Gross per 24 hour   Intake --   Output 750 ml   Net -750 ml        Assessment:     Patient Active Problem List   Diagnosis    Weakness of wrist     (spontaneous vaginal delivery)        Plan:   1. Postpartum care:  - Patient doing well. Continue routine management and advances.  - Continue PO pain meds. Pain well controlled.  - Heme: H/h .. Post:   - Encourage ambulation  - Contraception per primary OBGYN  - Lactation breastfeeding, consultation PRN    2. Acute blood anemia  - Heme: H/h .. Post:   -asx  -denies dizziness,weakness with walking and  urinating  -iron/colace    Dispo: As patient meets milestones, will plan to discharge PPD1-2.    Deon Phelps MD  OBGYN PGY-1

## 2019-12-03 NOTE — PLAN OF CARE
SOCIAL WORK DISCHARGE PLANNING ASSESSMENT    Sw completed discharge planning assessment with pt at pt's bedside. Pt was slow to warm-up. Education on the role of  was provided. Emotional support provided throughout assessment.    Pt reported she discontinued drug use when she found out she was pregnant. Pt denied any relapses during pregnancy. Utox waiting to be collected. Meconium not ordered.    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 Pt paid a maternity package out of pocket. Pt would like baby applied for medicaid if possible. Emailed Fredi GRACIA, medicaid rep, to assist with this.     Pediatrician: Dr. Mojica at [facility]      Nutrition: Expressed Breast Milk    Breast Pump:   Yes    Has obtained a pump    WIC:   N/A       Essential Items: (includes car seat, crib/bassinet/pack-n-play, clothing, bottles, diapers, etc.)  Acquired     Transportation: Personal vehicle and Family/friends     Potential Discharge Needs:  Possible DCFS report if utox results + for any substances.    Beverly Ortiz LCSW    [facility]  [email or web address]    (phone) [phone] or  [place] [id number]  (fax) [phone]

## 2019-12-04 VITALS
OXYGEN SATURATION: 98 % | WEIGHT: 147.69 LBS | HEIGHT: 60 IN | DIASTOLIC BLOOD PRESSURE: 64 MMHG | RESPIRATION RATE: 18 BRPM | BODY MASS INDEX: 28.99 KG/M2 | SYSTOLIC BLOOD PRESSURE: 107 MMHG | HEART RATE: 92 BPM | TEMPERATURE: 99 F

## 2019-12-04 PROCEDURE — 99232 PR SUBSEQUENT HOSPITAL CARE,LEVL II: ICD-10-PCS | Mod: ,,, | Performed by: OBSTETRICS & GYNECOLOGY

## 2019-12-04 PROCEDURE — 25000003 PHARM REV CODE 250: Performed by: STUDENT IN AN ORGANIZED HEALTH CARE EDUCATION/TRAINING PROGRAM

## 2019-12-04 PROCEDURE — 99232 SBSQ HOSP IP/OBS MODERATE 35: CPT | Mod: ,,, | Performed by: OBSTETRICS & GYNECOLOGY

## 2019-12-04 RX ORDER — DOCUSATE SODIUM 100 MG/1
200 CAPSULE, LIQUID FILLED ORAL 2 TIMES DAILY PRN
Qty: 30 CAPSULE | Refills: 1 | Status: SHIPPED | OUTPATIENT
Start: 2019-12-04

## 2019-12-04 RX ADMIN — PSEUDOEPHEDRINE HCL 60 MG: 30 TABLET, FILM COATED ORAL at 12:12

## 2019-12-04 RX ADMIN — FERROUS SULFATE TAB EC 325 MG (65 MG FE EQUIVALENT) 325 MG: 325 (65 FE) TABLET DELAYED RESPONSE at 08:12

## 2019-12-04 RX ADMIN — IBUPROFEN 600 MG: 600 TABLET, FILM COATED ORAL at 12:12

## 2019-12-04 RX ADMIN — IBUPROFEN 600 MG: 600 TABLET, FILM COATED ORAL at 08:12

## 2019-12-04 RX ADMIN — PRENATAL VIT W/ FE FUMARATE-FA TAB 27-0.8 MG 1 TABLET: 27-0.8 TAB at 08:12

## 2019-12-04 NOTE — LACTATION NOTE
12/04/19 1030   Maternal Assessment   Breast Shape Bilateral:;round;wide   Breast Density Bilateral:;soft   Areola Bilateral:;elastic   Nipples Bilateral:;everted   Maternal Infant Feeding   Maternal Emotional State assist needed   Latch Assistance no   Equipment Type   Breast Pump Type double electric, hospital grade   Breast Pump Flange Type hard   Breast Pump Flange Size 24 mm   Breast Pumping   Breast Pumping Interventions early pumping promoted;frequent pumping encouraged;post-feed pumping encouraged   Breast Pumping bilateral breasts pumped until soft;double electric breast pump utilized   Lactation Referrals   Lactation Referrals other (see comments)  (lactation warmline)   LC to room. Discharge lactation education completed yesterday by LC. Baby with jaundice, under triple phototherapy. Baby down 7.4% overnight; baby with green transitional stools today. Pt reports baby fed well at 0600 but has been sleepy since, recently latched for 4min feeding. Discussed jaundice and sleepy baby.  Symphony breast pump set up for extra stimulation. Educated on pump use on initiation setting, cleaning parts, to sterilize daily, milk storage/handling/labeling. 15ml expressed. Educated parents on how to cupfeed EBM while baby under phototherapy. Baby very sleepy, moderate assistance to cupfeed. Plan to nurse, pump, supplement every 3hrs. NP aware of plan. LC number on board, encouraged to call for assistance. Pt has [employer] PIS for home use.

## 2019-12-04 NOTE — PROGRESS NOTES
POSTPARTUM PROGRESS NOTE     Gabriela Lovelace is a 23 y.o. female PPD #2 status post Spontaneous vaginal delivery at 38w0d in a pregnancy complicated by periurethral laceration during delivery. Patient is doing well this morning. She denies nausea, vomiting, fever or chills.  Patient reports mild abdominal pain that is adequately relieved by oral pain medications. Lochia is mild to moderate  and stable. Patient is voiding without difficulty and ambulating with no difficulty. She has passed flatus, and has not had BM.  Patient does plan to breast feed. Per Dr. Wagner/Jeansonne for contraception.    Objective:       Temp:  [96.7 °F (35.9 °C)-98.7 °F (37.1 °C)] 96.7 °F (35.9 °C)  Pulse:  [88-96] 96  Resp:  [18] 18  SpO2:  [98 %-99 %] 99 %  BP: (104-118)/(51-65) 111/64    General:   alert, appears stated age and cooperative   Lungs:   clear to auscultation bilaterally   Heart:   regular rate and rhythm, S1, S2 normal, no murmur, click, rub or gallop   Abdomen:  soft, non-tender; bowel sounds normal; no masses,  no organomegaly   Uterus:  firm located at the umblicus.    Extremities: peripheral pulses normal, no pedal edema, no clubbing or cyanosis     Lab Review  No results found for this or any previous visit (from the past 4 hour(s)).    I/O  No intake or output data in the 24 hours ending 19 0652     Assessment:     Patient Active Problem List   Diagnosis    Weakness of wrist     (spontaneous vaginal delivery)        Plan:   1. Postpartum care:  - Patient doing well. Continue routine management and advances.  - Continue PO pain meds. Pain well controlled.  - Heme: H/h .. Post:   - Encourage ambulation  - Contraception per primary OBGYN  - Lactation breastfeeding, consultation PRN    2. Acute blood anemia  - Heme: H/h .. Post:   -asx  -denies dizziness,weakness with walking and urinating  -iron/colace    Dispo: As patient meets milestones, will plan to discharge  PPD2.    Deon Phelps MD  OBGYN PGY-1

## 2019-12-04 NOTE — PLAN OF CARE
Mother to breastfeed infant 8 or more times in 24hrs on infant's cue until content, frequent skin to skin, and will avoid bottles and pacifiers.  Mother is to keep infant actively feeding by keeping infant stimulated and using breast compression. Mother ensure effective nursing by hearing infant swallows and feeling nice tugs and pulls. Latch should not be painful while nursing.  Mother to record all breastfeedings, voids and stools in breastfeeding guide. Mother to call LC for breastfeeding assistance or questions .  Refer breastfeeding guide for lactation education.     Mother to nurse, pump, supplement with EBM each feeding due to jaundice.

## 2019-12-05 NOTE — DISCHARGE SUMMARY
Delivery Discharge Summary  Obstetrics      Primary OB Clinician: Julie Jeansonne, MD    Admission date: 2019  Discharge date: 2019    Disposition: To home, self care    Discharge Diagnosis List:      Patient Active Problem List   Diagnosis    Weakness of wrist     (spontaneous vaginal delivery)       Procedure:     Hospital Course:  Gabriela Lovelace is a 23 y.o. now , PPD #2 who was admitted on 2019 at 37w6d for rupture of membranes. Patient was subsequently admitted to labor and delivery unit with signed consents.     Labor course was uncomplicated and resulted in  without complications.     Please see delivery note for further details. Her postpartum course was uncomplicated. On discharge day, patient's pain is controlled with oral pain medications. Pt is tolerating ambulation without SOB or CP, and regular diet without N/V. Reports lochia is mild. Denies any HA, vision changes, F/C, LE swelling. Denies any breast pain/soreness.    Pt in stable condition and ready for discharge. She has been instructed to start and/or continue medications and follow up with her obstetrics provider as listed below.    Pertinent studies:  CBC  Recent Labs   Lab 19  2114 19  0544   WBC 14.59* 16.32*   HGB 12.5 8.1*   HCT 38.0 24.8*   MCV 88 89    219          Immunization History   Administered Date(s) Administered    Influenza - Quadrivalent - PF (6 months and older) 2019    Tdap 2019        Delivery:    Episiotomy: None   Lacerations: None   Repair suture:     Repair # of packets: 1   Blood loss (ml): 550     Birth information:  YOB: 2021   Time of birth: 3:38 AM   Sex: female   Delivery type: Vaginal, Spontaneous   Gestational Age: 38w0d    Delivery Clinician:      Other providers:       Additional  information:  Forceps:    Vacuum:    Breech:    Observed anomalies      Living?:           APGARS  One minute Five minutes Ten minutes   Skin  color:         Heart rate:         Grimace:         Muscle tone:         Breathing:         Totals: 7  9        Placenta: Delivered:       appearance      Patient Instructions:   Discharge Medication List as of 12/4/2019  3:58 PM      START taking these medications    Details   docusate sodium (COLACE) 100 MG capsule Take 2 capsules (200 mg total) by mouth 2 (two) times daily as needed for Constipation., Starting Wed 12/4/2019, Normal      ferrous sulfate (FEOSOL) 325 mg (65 mg iron) Tab tablet Take 1 tablet (325 mg total) by mouth once daily., Starting Tue 12/3/2019, Normal      ibuprofen (ADVIL,MOTRIN) 600 MG tablet Take 1 tablet (600 mg total) by mouth every 6 (six) hours as needed for Pain., Starting Mon 12/2/2019, Normal             Discharge Procedure Orders   Diet Adult Regular     Diet Adult Regular     Other restrictions (specify):   Order Comments: Pelvic rest- nothing in the vagina for 6 weeks (no sex, douching, tampons, etc).   No driving until not taking narcotics, or until you feel as though you can safely slam on the brakes without pain  No baths for 6 weeks, only showers     Notify your health care provider if you experience any of the following:  temperature >100.4     Notify your health care provider if you experience any of the following:  persistent nausea and vomiting or diarrhea     Notify your health care provider if you experience any of the following:  severe uncontrolled pain     Notify your health care provider if you experience any of the following:  redness, tenderness, or signs of infection (pain, swelling, redness, odor or green/yellow discharge around incision site)     Notify your health care provider if you experience any of the following:  difficulty breathing or increased cough     Notify your health care provider if you experience any of the following:  severe persistent headache     Notify your health care provider if you experience any of the following:  worsening rash      Notify your health care provider if you experience any of the following:  persistent dizziness, light-headedness, or visual disturbances     Notify your health care provider if you experience any of the following:  increased confusion or weakness     Notify your health care provider if you experience any of the following:   Order Comments: Heavy vaginal bleeding saturating more than 1 pad per hour for at least 2 hours.     Pelvic Rest   Order Comments: Pelvic rest for at least 6 weeks. Nothing in vagina - no sex, tampons, or douching for 6 weeks     Notify your health care provider if you experience any of the following:   Order Comments: Heavy vaginal bleeding saturating more than 1 pad per hour for at least 2 hours.     Notify your health care provider if you experience any of the following:  increased confusion or weakness     Notify your health care provider if you experience any of the following:  persistent dizziness, light-headedness, or visual disturbances     Notify your health care provider if you experience any of the following:  severe persistent headache     Notify your health care provider if you experience any of the following:  difficulty breathing or increased cough     Notify your health care provider if you experience any of the following:  severe uncontrolled pain     Notify your health care provider if you experience any of the following:  persistent nausea and vomiting or diarrhea     Notify your health care provider if you experience any of the following:  temperature >100.4     Activity as tolerated     Activity as tolerated       Follow-up Information     Julie R Jeansonne, MD In 6 weeks.    Specialty:  Obstetrics and Gynecology  Why:  Post-partum visit  Contact information:  [address] [address]  [phone]                    Helene Figueroa MD  OBGYN, PGY-2

## 2019-12-07 ENCOUNTER — TELEPHONE (OUTPATIENT)
Dept: LACTATION | Facility: CLINIC | Age: 24
End: 2019-12-07

## 2019-12-07 NOTE — TELEPHONE ENCOUNTER
"Lactation note:  Called patient to follow up on progress with breastfeeding. Mom states things are well and she is "milk crazy." She is nursing her baby every 1.5 hours for about 5-30 minutes. The infant goes up to 4 hours at night without nursing, then mom feeds her up to 30 ml with a bottle after nursing. Mom usually pumps about 80 ml after nursing. She feels her infant is latching well and can hear her drinking; her breasts are softer after nursing. She was dealing with some engorgement but that is better now that she is pumping. The infant is having more than 5 heavy wet diapers and more than 3 yellow, seedy diapers a day. Her bilirubin level is down to 11.8 as of yesterday and she has gained 2 oz since discharge. The next appointment for weight is Friday. Encouragement provided; discussed feeding plan of nursing at least every 3 hours with breast compression/stimulation to ensure infant finishes the breast then repeat on opposite breast. Mom to pump after nursing and offer expressed breast milk after nursing in a bottle. Offered [facility] help or further help by phone as needed. Mom has  phone number.   "

## 2019-12-19 ENCOUNTER — TELEPHONE (OUTPATIENT)
Dept: OBSTETRICS AND GYNECOLOGY | Facility: OTHER | Age: 24
End: 2019-12-19

## 2020-01-15 ENCOUNTER — POSTPARTUM VISIT (OUTPATIENT)
Dept: OBSTETRICS AND GYNECOLOGY | Facility: CLINIC | Age: 25
End: 2020-01-15
Payer: COMMERCIAL

## 2020-01-15 VITALS
WEIGHT: 143.31 LBS | DIASTOLIC BLOOD PRESSURE: 62 MMHG | SYSTOLIC BLOOD PRESSURE: 121 MMHG | HEIGHT: 60 IN | BODY MASS INDEX: 28.13 KG/M2

## 2020-01-15 DIAGNOSIS — Z12.4 CERVICAL CANCER SCREENING: Primary | ICD-10-CM

## 2020-01-15 PROCEDURE — 88175 CYTOPATH C/V AUTO FLUID REDO: CPT

## 2020-01-15 NOTE — PROGRESS NOTES
CC: Postpartum Visit    Gabriela Lovelace is a 24 y.o. female  who presents for postpartum visit. She is s/p . She is breast feeding. Had a baby girl. She and the baby are doing well. Lochia subsiding. She denies pain, fever, bowel/bladder complaints. Mood is overall well.     ROS:  GENERAL: Denies fevers, chills  VAGINAL: Denies abnormal discharge, heavy bleeding  ABDOMEN: Denies abdominal pain, constipation, diarrhea  BREAST: Denies pain.   URINARY: Denies urgency, frequency, incontinence   CARDIOVASCULAR: Denies chest pain, shortness of breath.  NEUROLOGICAL: Denies headaches, vision changes    PHYSICAL EXAM:  /62   Ht 5' (1.524 m)   Wt 65 kg (143 lb 4.8 oz)   LMP 2019 (Exact Date)   Breastfeeding? Yes   BMI 27.99 kg/m²   GEN: No apparent distress  CV: Regular rate  PULM: No increased work of breathing  ABDOMEN:  Soft, non-tender, non-distended  VULVA:  Normal, no lesions  CERVIX:  Without lesions, polyps    ASSESSMENT: Doing well s/p .     PLAN:  1. May resume all normal activities  2. Mirena IUD for contraception, ordered and she will return for insertion  3. no signs/symptoms of PP depression  4. Breast Feeding  5. Pap today     Megan Wagner MD  Obstetrics and Gynecology  [facility]

## 2020-01-31 LAB
FINAL PATHOLOGIC DIAGNOSIS: NORMAL
Lab: NORMAL

## 2020-03-04 ENCOUNTER — PATIENT MESSAGE (OUTPATIENT)
Dept: OBSTETRICS AND GYNECOLOGY | Facility: CLINIC | Age: 25
End: 2020-03-04

## 2020-03-04 ENCOUNTER — TELEPHONE (OUTPATIENT)
Dept: OBSTETRICS AND GYNECOLOGY | Facility: CLINIC | Age: 25
End: 2020-03-04

## 2020-03-04 DIAGNOSIS — Z30.9 ENCOUNTER FOR CONTRACEPTIVE MANAGEMENT, UNSPECIFIED TYPE: Primary | ICD-10-CM

## 2020-03-04 NOTE — TELEPHONE ENCOUNTER
----- Message from Clayton Stuart sent at 3/4/2020  7:44 AM CST -----  Contact: FLAKITA CARRENO   Name of Who is Calling: FLAKITA CARRENO       What is the request in detail: Patient is requesting a call back regarding an IUD order. Please contact to further advise.      Can the clinic reply by [facility]: NO      What Number to Call Back if not in [facility]: [phone]

## 2020-04-21 ENCOUNTER — TELEPHONE (OUTPATIENT)
Dept: PHARMACY | Facility: CLINIC | Age: 25
End: 2020-04-21

## 2020-04-21 NOTE — TELEPHONE ENCOUNTER
Patient returned [facility] in regards to Mirena IUD coverage and spoke with Ora Eller - $0 copay. Consultation declined. Consent to deliver IUD to [facility] for insertion granted by patient. No further questions or concerns. Appt scheduled: none. Msg sent to provider's staff to confirm delivery address. TTGRIS

## 2020-04-21 NOTE — TELEPHONE ENCOUNTER
Patient returned call for Mirena initial consult. She declined initial consultation for Mirena as she has received information from MD and will have the IUD inserted in the [facility] office. Co-pay $0.00. Patient gave consent for us to reach out to [facility] office to set up delivery. She has no questions or concerns at this time. She is aware to contact [facility] if she needs anything.

## 2020-05-05 ENCOUNTER — TELEPHONE (OUTPATIENT)
Dept: OBSTETRICS AND GYNECOLOGY | Facility: CLINIC | Age: 25
End: 2020-05-05

## 2020-05-05 NOTE — TELEPHONE ENCOUNTER
----- Message from Sara Aviles sent at 5/5/2020  8:22 AM CDT -----  Contact: Pt    Name of Who is Calling:FLAKITA CARRENO [[id number]]      What is the request in detail: Pt would like reschedule her procedure to have   Mirena inserted Please contact to further discuss and advise    Can the clinic reply by [facility]: Yes      [facility]: [phone]

## 2020-05-07 ENCOUNTER — PROCEDURE VISIT (OUTPATIENT)
Dept: OBSTETRICS AND GYNECOLOGY | Facility: CLINIC | Age: 25
End: 2020-05-07
Payer: COMMERCIAL

## 2020-05-07 VITALS — WEIGHT: 145 LBS | BODY MASS INDEX: 28.47 KG/M2 | HEIGHT: 60 IN

## 2020-05-07 DIAGNOSIS — Z30.430 ENCOUNTER FOR IUD INSERTION: Primary | ICD-10-CM

## 2020-05-07 LAB
B-HCG UR QL: NEGATIVE
CTP QC/QA: YES

## 2020-05-07 PROCEDURE — 58300 INSERT INTRAUTERINE DEVICE: CPT | Mod: S$GLB,,, | Performed by: OBSTETRICS & GYNECOLOGY

## 2020-05-07 PROCEDURE — 81025 URINE PREGNANCY TEST: CPT | Mod: S$GLB,,, | Performed by: OBSTETRICS & GYNECOLOGY

## 2020-05-07 PROCEDURE — 81025 POCT URINE PREGNANCY: ICD-10-PCS | Mod: S$GLB,,, | Performed by: OBSTETRICS & GYNECOLOGY

## 2020-05-07 PROCEDURE — 58300 INSERTION OF IUD: ICD-10-PCS | Mod: S$GLB,,, | Performed by: OBSTETRICS & GYNECOLOGY

## 2020-05-07 NOTE — PROCEDURES
Insertion of IUD  Date/Time: 5/7/2020 2:45 PM  Performed by: Megan Wagner MD  Authorized by: Megan Wagner MD     Consent:     Consent obtained:  Written    Consent given by:  Patient    Procedure risks and benefits discussed: yes      Patient questions answered: yes      Patient agrees, verbalizes understanding, and wants to proceed: yes      Educational handouts given: yes      Instructions and paperwork completed: yes    Procedure:     Pelvic exam performed: yes      Negative urine pregnancy test: yes      Cervix cleaned and prepped: yes      Speculum placed in vagina: yes      Tenaculum applied to cervix: yes      Uterus sounded: yes      Uterus sound depth (cm):  6    IUD inserted with no complications: yes      IUD type:  Mirena    Strings trimmed: yes    Post-procedure:     Patient tolerated procedure well: yes      Patient will follow up after next period: yes      Megan Wagner MD  Obstetrics and Gynecology  [facility]

## 2020-05-26 ENCOUNTER — PATIENT MESSAGE (OUTPATIENT)
Dept: OBSTETRICS AND GYNECOLOGY | Facility: CLINIC | Age: 25
End: 2020-05-26

## 2020-06-09 ENCOUNTER — OFFICE VISIT (OUTPATIENT)
Dept: OBSTETRICS AND GYNECOLOGY | Facility: CLINIC | Age: 25
End: 2020-06-09
Payer: COMMERCIAL

## 2020-06-09 VITALS
DIASTOLIC BLOOD PRESSURE: 88 MMHG | BODY MASS INDEX: 28.61 KG/M2 | SYSTOLIC BLOOD PRESSURE: 104 MMHG | HEIGHT: 60 IN | WEIGHT: 145.75 LBS

## 2020-06-09 DIAGNOSIS — Z30.431 IUD CHECK UP: Primary | ICD-10-CM

## 2020-06-09 PROCEDURE — 99499 NO LOS: ICD-10-PCS | Mod: S$GLB,,, | Performed by: OBSTETRICS & GYNECOLOGY

## 2020-06-09 PROCEDURE — 99499 UNLISTED E&M SERVICE: CPT | Mod: S$GLB,,, | Performed by: OBSTETRICS & GYNECOLOGY

## 2020-06-09 PROCEDURE — 99999 PR PBB SHADOW E&M-EST. PATIENT-LVL II: ICD-10-PCS | Mod: PBBFAC,,, | Performed by: OBSTETRICS & GYNECOLOGY

## 2020-06-09 PROCEDURE — 99999 PR PBB SHADOW E&M-EST. PATIENT-LVL II: CPT | Mod: PBBFAC,,, | Performed by: OBSTETRICS & GYNECOLOGY

## 2020-06-09 NOTE — PROGRESS NOTES
CC: IUD check    Gabriela Lovelace is a 24 y.o. female  presents for IUD check.  Patient had a Mirena placed by Dr. Wagner on May 7, 2020.  She has been having cramping and spotting since placement but wants to continue to see if it improves.     Vitals:    20 1047   BP: 104/88       PHYSICAL EXAM:  Abdomen:  Soft, non-tender, non-distended  Vulva:  No lesions  Vaginal:  No lesions, no abnormal discharge  Cervix: No discharge, no CMT, IUD strings visible at os.  Uterus:  Normal size, non-tender  Adnexa:  No masses, non-tender    ASSESSMENT AND PLAN:  1. IUD surveillance    Patient counseled on IUD danger signs and how to check the strings reviewed.    Return for annual GYN exam

## 2021-04-28 ENCOUNTER — PATIENT MESSAGE (OUTPATIENT)
Dept: RESEARCH | Facility: HOSPITAL | Age: 26
End: 2021-04-28

## 2021-07-31 ENCOUNTER — OFFICE VISIT (OUTPATIENT)
Dept: URGENT CARE | Facility: CLINIC | Age: 26
End: 2021-07-31

## 2021-07-31 VITALS
WEIGHT: 145 LBS | DIASTOLIC BLOOD PRESSURE: 78 MMHG | BODY MASS INDEX: 28.47 KG/M2 | OXYGEN SATURATION: 98 % | TEMPERATURE: 99 F | SYSTOLIC BLOOD PRESSURE: 122 MMHG | RESPIRATION RATE: 16 BRPM | HEART RATE: 73 BPM | HEIGHT: 60 IN

## 2021-07-31 DIAGNOSIS — Z20.822 CONTACT WITH AND (SUSPECTED) EXPOSURE TO COVID-19: Primary | ICD-10-CM

## 2021-07-31 LAB
CTP QC/QA: YES
SARS-COV-2 RDRP RESP QL NAA+PROBE: NEGATIVE

## 2021-07-31 PROCEDURE — U0002: ICD-10-PCS | Mod: QW,TIER,S$GLB, | Performed by: PHYSICIAN ASSISTANT

## 2021-07-31 PROCEDURE — 99203 PR OFFICE/OUTPT VISIT, NEW, LEVL III, 30-44 MIN: ICD-10-PCS | Mod: TIER,S$GLB,, | Performed by: PHYSICIAN ASSISTANT

## 2021-07-31 PROCEDURE — 99203 OFFICE O/P NEW LOW 30 MIN: CPT | Mod: TIER,S$GLB,, | Performed by: PHYSICIAN ASSISTANT

## 2021-07-31 PROCEDURE — U0002 COVID-19 LAB TEST NON-CDC: HCPCS | Mod: QW,TIER,S$GLB, | Performed by: PHYSICIAN ASSISTANT

## 2021-08-12 ENCOUNTER — OFFICE VISIT (OUTPATIENT)
Dept: OBSTETRICS AND GYNECOLOGY | Facility: CLINIC | Age: 26
End: 2021-08-12

## 2021-08-12 VITALS — BODY MASS INDEX: 26.95 KG/M2 | SYSTOLIC BLOOD PRESSURE: 118 MMHG | WEIGHT: 138 LBS | DIASTOLIC BLOOD PRESSURE: 64 MMHG

## 2021-08-12 DIAGNOSIS — R10.2 PELVIC PAIN: Primary | ICD-10-CM

## 2021-08-12 DIAGNOSIS — Z30.432 ENCOUNTER FOR IUD REMOVAL: ICD-10-CM

## 2021-08-12 DIAGNOSIS — N93.9 ABNORMAL UTERINE BLEEDING (AUB): ICD-10-CM

## 2021-08-12 PROCEDURE — 99999 PR PBB SHADOW E&M-EST. PATIENT-LVL II: ICD-10-PCS | Mod: PBBFAC,,, | Performed by: OBSTETRICS & GYNECOLOGY

## 2021-08-12 PROCEDURE — 99499 UNLISTED E&M SERVICE: CPT | Mod: S$PBB,,, | Performed by: OBSTETRICS & GYNECOLOGY

## 2021-08-12 PROCEDURE — 99499 NO LOS: ICD-10-PCS | Mod: S$PBB,,, | Performed by: OBSTETRICS & GYNECOLOGY

## 2021-08-12 PROCEDURE — 58301 REMOVAL OF IUD: ICD-10-PCS | Mod: S$PBB,,, | Performed by: OBSTETRICS & GYNECOLOGY

## 2021-08-12 PROCEDURE — 58301 REMOVE INTRAUTERINE DEVICE: CPT | Mod: PBBFAC | Performed by: OBSTETRICS & GYNECOLOGY

## 2021-08-12 PROCEDURE — 99212 OFFICE O/P EST SF 10 MIN: CPT | Mod: PBBFAC | Performed by: OBSTETRICS & GYNECOLOGY

## 2021-08-12 PROCEDURE — 99999 PR PBB SHADOW E&M-EST. PATIENT-LVL II: CPT | Mod: PBBFAC,,, | Performed by: OBSTETRICS & GYNECOLOGY

## 2021-08-24 ENCOUNTER — TELEPHONE (OUTPATIENT)
Dept: ORTHOPEDICS | Facility: CLINIC | Age: 26
End: 2021-08-24

## 2021-08-24 DIAGNOSIS — R52 PAIN: Primary | ICD-10-CM

## 2021-08-25 ENCOUNTER — OFFICE VISIT (OUTPATIENT)
Dept: ORTHOPEDICS | Facility: CLINIC | Age: 26
End: 2021-08-25

## 2021-08-25 ENCOUNTER — HOSPITAL ENCOUNTER (OUTPATIENT)
Dept: RADIOLOGY | Facility: OTHER | Age: 26
Discharge: HOME | End: 2021-08-25
Attending: PHYSICIAN ASSISTANT

## 2021-08-25 VITALS
BODY MASS INDEX: 27.09 KG/M2 | HEIGHT: 60 IN | DIASTOLIC BLOOD PRESSURE: 76 MMHG | HEART RATE: 72 BPM | SYSTOLIC BLOOD PRESSURE: 117 MMHG | WEIGHT: 138 LBS

## 2021-08-25 DIAGNOSIS — S63.92XA HAND SPRAIN, LEFT, INITIAL ENCOUNTER: Primary | ICD-10-CM

## 2021-08-25 DIAGNOSIS — R52 PAIN: ICD-10-CM

## 2021-08-25 DIAGNOSIS — R20.0 FINGER NUMBNESS: ICD-10-CM

## 2021-08-25 PROCEDURE — 73130 XR HAND COMPLETE 3 VIEWS BILATERAL: ICD-10-PCS | Mod: 26,50,, | Performed by: RADIOLOGY

## 2021-08-25 PROCEDURE — 99214 PR OFFICE/OUTPT VISIT, EST, LEVL IV, 30-39 MIN: ICD-10-PCS | Mod: 25,S$PBB,, | Performed by: PHYSICIAN ASSISTANT

## 2021-08-25 PROCEDURE — 20526 THER INJECTION CARP TUNNEL: CPT | Mod: PBBFAC,RT | Performed by: PHYSICIAN ASSISTANT

## 2021-08-25 PROCEDURE — 20526 PR INJECT CARPAL TUNNEL: ICD-10-PCS | Mod: S$PBB,RT,, | Performed by: PHYSICIAN ASSISTANT

## 2021-08-25 PROCEDURE — 76942 ECHO GUIDE FOR BIOPSY: CPT | Mod: PBBFAC | Performed by: PHYSICIAN ASSISTANT

## 2021-08-25 PROCEDURE — 99999 PR PBB SHADOW E&M-EST. PATIENT-LVL IV: ICD-10-PCS | Mod: PBBFAC,,, | Performed by: PHYSICIAN ASSISTANT

## 2021-08-25 PROCEDURE — 20526 THER INJECTION CARP TUNNEL: CPT | Mod: S$PBB,RT,, | Performed by: PHYSICIAN ASSISTANT

## 2021-08-25 PROCEDURE — 99999 PR PBB SHADOW E&M-EST. PATIENT-LVL IV: CPT | Mod: PBBFAC,,, | Performed by: PHYSICIAN ASSISTANT

## 2021-08-25 PROCEDURE — 99214 OFFICE O/P EST MOD 30 MIN: CPT | Mod: PBBFAC,25 | Performed by: PHYSICIAN ASSISTANT

## 2021-08-25 PROCEDURE — 97760 PR ORTHOTIC MGMT&TRAINJ INITIAL ENC EA 15 MINS: ICD-10-PCS | Mod: GP,,, | Performed by: PHYSICIAN ASSISTANT

## 2021-08-25 PROCEDURE — 99214 OFFICE O/P EST MOD 30 MIN: CPT | Mod: 25,S$PBB,, | Performed by: PHYSICIAN ASSISTANT

## 2021-08-25 PROCEDURE — 73130 X-RAY EXAM OF HAND: CPT | Mod: TC,50,FY

## 2021-08-25 PROCEDURE — 73130 X-RAY EXAM OF HAND: CPT | Mod: 26,50,, | Performed by: RADIOLOGY

## 2021-08-25 PROCEDURE — 97760 ORTHOTIC MGMT&TRAING 1ST ENC: CPT | Mod: GP,,, | Performed by: PHYSICIAN ASSISTANT

## 2021-08-25 RX ORDER — LIDOCAINE HYDROCHLORIDE 10 MG/ML
1 INJECTION, SOLUTION EPIDURAL; INFILTRATION; INTRACAUDAL; PERINEURAL
Status: COMPLETED | OUTPATIENT
Start: 2021-08-25 | End: 2021-08-25

## 2021-08-25 RX ORDER — DEXAMETHASONE SODIUM PHOSPHATE 4 MG/ML
4 INJECTION, SOLUTION INTRA-ARTICULAR; INTRALESIONAL; INTRAMUSCULAR; INTRAVENOUS; SOFT TISSUE
Status: COMPLETED | OUTPATIENT
Start: 2021-08-25 | End: 2021-08-25

## 2021-08-25 RX ADMIN — LIDOCAINE HYDROCHLORIDE 10 MG: 10 INJECTION, SOLUTION EPIDURAL; INFILTRATION; INTRACAUDAL; PERINEURAL at 09:08

## 2021-08-25 RX ADMIN — DEXAMETHASONE SODIUM PHOSPHATE 4 MG: 4 INJECTION, SOLUTION INTRA-ARTICULAR; INTRALESIONAL; INTRAMUSCULAR; INTRAVENOUS; SOFT TISSUE at 09:08

## 2025-05-12 NOTE — PLAN OF CARE
Gabriela Kingzoraida Cooper has met all discharge criteria from Phase II. Vital Signs are stable, ambulating  without difficulty. Discharge instructions given, patient verbalized understanding. Discharged from facility via wheelchair in stable condition.        ambulatory

## (undated) DEVICE — K-WIRE SNGL TRCR .045 D 6L N/S
Type: IMPLANTABLE DEVICE | Site: WRIST | Status: NON-FUNCTIONAL
Removed: 2018-06-15

## (undated) DEVICE — SLING ARM SMALL FOAM STRAP

## (undated) DEVICE — SCRUB 10% POVIDONE IODINE 4OZ

## (undated) DEVICE — PACK UPPER EXTREMITY BAPTIST

## (undated) DEVICE — UNDERGLOVES BIOGEL PI SZ 7 LF

## (undated) DEVICE — PAD CAST SPECIALIST STRL 4

## (undated) DEVICE — FORCEP STRAIGHT DISP

## (undated) DEVICE — TOURNIQUET SB QC SP 18X4IN

## (undated) DEVICE — SUT MONOCRYL 4-0 PS-2

## (undated) DEVICE — DRAPE C-ARM MINI DISP

## (undated) DEVICE — DRESSING N ADH OIL EMUL 3X3

## (undated) DEVICE — SYR B-D DISP CONTROL 10CC100/C

## (undated) DEVICE — SPLINT PLASTER F.S 4INX15IN

## (undated) DEVICE — DRAPE SURG W/TWL 17 5/8X23

## (undated) DEVICE — SUT MONOCRYL PLUS UD 3-0 27

## (undated) DEVICE — SEE MEDLINE ITEM 146308

## (undated) DEVICE — SOL 9P NACL IRR PIC IL

## (undated) DEVICE — TRAY DRY SKIN SCRUB PREP

## (undated) DEVICE — GLOVE BIOGEL SKINSENSE PI 7.0

## (undated) DEVICE — APPLICATOR CHLORAPREP ORN 26ML

## (undated) DEVICE — SEE MEDLINE ITEM 157131

## (undated) DEVICE — SUT MCRYL PLUS 4-0 PS2 27IN

## (undated) DEVICE — GAUZE SPONGE 4'X4 12 PLY

## (undated) DEVICE — PAD UNDERPAD 30X30

## (undated) DEVICE — BUCKET PLASTER DISPOSABLE

## (undated) DEVICE — SLING ARM LARGE FOAM STRAP

## (undated) DEVICE — BIT DRILL BONE MINI 18MM LONG

## (undated) DEVICE — DRESSING XEROFORM FOIL PK 1X8

## (undated) DEVICE — BLADE SURG STAINLESS STEEL #15

## (undated) DEVICE — SEE MEDLINE ITEM 152515

## (undated) DEVICE — CORD BIPOLAR 12 FT STERILE

## (undated) DEVICE — NDL HYPO REG 25G X 1 1/2

## (undated) DEVICE — FORCEP BIPOLAR ADSON 1MM TIP

## (undated) DEVICE — SPLINT PLASTER FAST SET 5X30IN

## (undated) DEVICE — GAUZE SPONGE 4X4 12PLY

## (undated) DEVICE — NDL SAFETY 22G X 1.5 ECLIPSE